# Patient Record
Sex: MALE | Race: BLACK OR AFRICAN AMERICAN | URBAN - METROPOLITAN AREA
[De-identification: names, ages, dates, MRNs, and addresses within clinical notes are randomized per-mention and may not be internally consistent; named-entity substitution may affect disease eponyms.]

---

## 2020-12-15 ENCOUNTER — APPOINTMENT (OUTPATIENT)
Dept: GENERAL RADIOLOGY | Facility: CLINIC | Age: 60
End: 2020-12-15
Attending: EMERGENCY MEDICINE
Payer: MEDICAID

## 2020-12-15 ENCOUNTER — APPOINTMENT (OUTPATIENT)
Dept: CT IMAGING | Facility: CLINIC | Age: 60
End: 2020-12-15
Attending: PHYSICIAN ASSISTANT
Payer: MEDICAID

## 2020-12-15 ENCOUNTER — ANCILLARY PROCEDURE (OUTPATIENT)
Dept: ULTRASOUND IMAGING | Facility: CLINIC | Age: 60
End: 2020-12-15
Attending: EMERGENCY MEDICINE
Payer: MEDICAID

## 2020-12-15 ENCOUNTER — HOSPITAL ENCOUNTER (INPATIENT)
Facility: CLINIC | Age: 60
LOS: 1 days | Discharge: HOME | End: 2020-12-16
Attending: EMERGENCY MEDICINE | Admitting: INTERNAL MEDICINE
Payer: MEDICAID

## 2020-12-15 ENCOUNTER — APPOINTMENT (OUTPATIENT)
Dept: CT IMAGING | Facility: CLINIC | Age: 60
End: 2020-12-15
Attending: EMERGENCY MEDICINE
Payer: MEDICAID

## 2020-12-15 DIAGNOSIS — I25.119 CORONARY ARTERY DISEASE INVOLVING NATIVE CORONARY ARTERY OF NATIVE HEART WITH ANGINA PECTORIS (H): Primary | ICD-10-CM

## 2020-12-15 DIAGNOSIS — Z20.828 EXPOSURE TO SARS-ASSOCIATED CORONAVIRUS: ICD-10-CM

## 2020-12-15 DIAGNOSIS — I24.9 ACS (ACUTE CORONARY SYNDROME) (H): ICD-10-CM

## 2020-12-15 LAB
ALBUMIN SERPL-MCNC: 3.8 G/DL (ref 3.4–5)
ALP SERPL-CCNC: 92 U/L (ref 40–150)
ALT SERPL W P-5'-P-CCNC: 23 U/L (ref 0–70)
ANION GAP SERPL CALCULATED.3IONS-SCNC: 3 MMOL/L (ref 3–14)
APTT PPP: 76 SEC (ref 22–37)
AST SERPL W P-5'-P-CCNC: 16 U/L (ref 0–45)
BASOPHILS # BLD AUTO: 0 10E9/L (ref 0–0.2)
BASOPHILS NFR BLD AUTO: 0.6 %
BILIRUB DIRECT SERPL-MCNC: 0.1 MG/DL (ref 0–0.2)
BILIRUB SERPL-MCNC: 0.4 MG/DL (ref 0.2–1.3)
BUN SERPL-MCNC: 17 MG/DL (ref 7–30)
CALCIUM SERPL-MCNC: 9.7 MG/DL (ref 8.5–10.1)
CHLORIDE SERPL-SCNC: 107 MMOL/L (ref 94–109)
CO2 SERPL-SCNC: 30 MMOL/L (ref 20–32)
CREAT SERPL-MCNC: 1.27 MG/DL (ref 0.66–1.25)
D DIMER PPP FEU-MCNC: 0.3 UG/ML FEU (ref 0–0.5)
DIFFERENTIAL METHOD BLD: NORMAL
EOSINOPHIL # BLD AUTO: 0.2 10E9/L (ref 0–0.7)
EOSINOPHIL NFR BLD AUTO: 2.1 %
ERYTHROCYTE [DISTWIDTH] IN BLOOD BY AUTOMATED COUNT: 12.4 % (ref 10–15)
GFR SERPL CREATININE-BSD FRML MDRD: 62 ML/MIN/{1.73_M2}
GLUCOSE SERPL-MCNC: 88 MG/DL (ref 70–99)
HCT VFR BLD AUTO: 44.9 % (ref 40–53)
HGB BLD-MCNC: 14.8 G/DL (ref 13.3–17.7)
IMM GRANULOCYTES # BLD: 0 10E9/L (ref 0–0.4)
IMM GRANULOCYTES NFR BLD: 0.1 %
INTERPRETATION ECG - MUSE: NORMAL
LYMPHOCYTES # BLD AUTO: 1.8 10E9/L (ref 0.8–5.3)
LYMPHOCYTES NFR BLD AUTO: 25.4 %
MCH RBC QN AUTO: 29.5 PG (ref 26.5–33)
MCHC RBC AUTO-ENTMCNC: 33 G/DL (ref 31.5–36.5)
MCV RBC AUTO: 90 FL (ref 78–100)
MONOCYTES # BLD AUTO: 0.6 10E9/L (ref 0–1.3)
MONOCYTES NFR BLD AUTO: 8.2 %
NEUTROPHILS # BLD AUTO: 4.5 10E9/L (ref 1.6–8.3)
NEUTROPHILS NFR BLD AUTO: 63.6 %
NRBC # BLD AUTO: 0 10*3/UL
NRBC BLD AUTO-RTO: 0 /100
PLATELET # BLD AUTO: 229 10E9/L (ref 150–450)
POTASSIUM SERPL-SCNC: 4.3 MMOL/L (ref 3.4–5.3)
PROT SERPL-MCNC: 7.5 G/DL (ref 6.8–8.8)
RBC # BLD AUTO: 5.01 10E12/L (ref 4.4–5.9)
SARS-COV-2 RNA SPEC QL NAA+PROBE: NORMAL
SODIUM SERPL-SCNC: 140 MMOL/L (ref 133–144)
SPECIMEN SOURCE: NORMAL
TROPONIN I BLD-MCNC: 0 UG/L (ref 0–0.08)
TROPONIN I SERPL-MCNC: <0.015 UG/L (ref 0–0.04)
UFH PPP CHRO-ACNC: 0.6 IU/ML
WBC # BLD AUTO: 7.1 10E9/L (ref 4–11)

## 2020-12-15 PROCEDURE — 99291 CRITICAL CARE FIRST HOUR: CPT | Mod: 25 | Performed by: EMERGENCY MEDICINE

## 2020-12-15 PROCEDURE — 70450 CT HEAD/BRAIN W/O DYE: CPT

## 2020-12-15 PROCEDURE — 75574 CT ANGIO HRT W/3D IMAGE: CPT | Mod: 26 | Performed by: INTERNAL MEDICINE

## 2020-12-15 PROCEDURE — 80076 HEPATIC FUNCTION PANEL: CPT | Performed by: EMERGENCY MEDICINE

## 2020-12-15 PROCEDURE — 71275 CT ANGIOGRAPHY CHEST: CPT

## 2020-12-15 PROCEDURE — 96366 THER/PROPH/DIAG IV INF ADDON: CPT | Performed by: EMERGENCY MEDICINE

## 2020-12-15 PROCEDURE — 027034Z DILATION OF CORONARY ARTERY, ONE ARTERY WITH DRUG-ELUTING INTRALUMINAL DEVICE, PERCUTANEOUS APPROACH: ICD-10-PCS | Performed by: INTERNAL MEDICINE

## 2020-12-15 PROCEDURE — 84484 ASSAY OF TROPONIN QUANT: CPT | Performed by: INTERNAL MEDICINE

## 2020-12-15 PROCEDURE — 93010 ELECTROCARDIOGRAM REPORT: CPT | Mod: 59 | Performed by: EMERGENCY MEDICINE

## 2020-12-15 PROCEDURE — 250N000011 HC RX IP 250 OP 636: Performed by: EMERGENCY MEDICINE

## 2020-12-15 PROCEDURE — 93308 TTE F-UP OR LMTD: CPT | Performed by: EMERGENCY MEDICINE

## 2020-12-15 PROCEDURE — 99285 EMERGENCY DEPT VISIT HI MDM: CPT | Mod: 25 | Performed by: EMERGENCY MEDICINE

## 2020-12-15 PROCEDURE — 999N000111 HC STATISTIC OT IP EVAL DEFER: Performed by: OCCUPATIONAL THERAPIST

## 2020-12-15 PROCEDURE — 71275 CT ANGIOGRAPHY CHEST: CPT | Mod: 26 | Performed by: INTERNAL MEDICINE

## 2020-12-15 PROCEDURE — 96376 TX/PRO/DX INJ SAME DRUG ADON: CPT | Performed by: EMERGENCY MEDICINE

## 2020-12-15 PROCEDURE — 99223 1ST HOSP IP/OBS HIGH 75: CPT | Performed by: PHYSICIAN ASSISTANT

## 2020-12-15 PROCEDURE — 84484 ASSAY OF TROPONIN QUANT: CPT

## 2020-12-15 PROCEDURE — 71045 X-RAY EXAM CHEST 1 VIEW: CPT

## 2020-12-15 PROCEDURE — 250N000013 HC RX MED GY IP 250 OP 250 PS 637: Performed by: INTERNAL MEDICINE

## 2020-12-15 PROCEDURE — 214N000001 HC R&B CCU UMMC

## 2020-12-15 PROCEDURE — 84484 ASSAY OF TROPONIN QUANT: CPT | Performed by: STUDENT IN AN ORGANIZED HEALTH CARE EDUCATION/TRAINING PROGRAM

## 2020-12-15 PROCEDURE — 93005 ELECTROCARDIOGRAM TRACING: CPT | Performed by: EMERGENCY MEDICINE

## 2020-12-15 PROCEDURE — 80048 BASIC METABOLIC PNL TOTAL CA: CPT | Performed by: EMERGENCY MEDICINE

## 2020-12-15 PROCEDURE — U0003 INFECTIOUS AGENT DETECTION BY NUCLEIC ACID (DNA OR RNA); SEVERE ACUTE RESPIRATORY SYNDROME CORONAVIRUS 2 (SARS-COV-2) (CORONAVIRUS DISEASE [COVID-19]), AMPLIFIED PROBE TECHNIQUE, MAKING USE OF HIGH THROUGHPUT TECHNOLOGIES AS DESCRIBED BY CMS-2020-01-R: HCPCS | Performed by: PHYSICIAN ASSISTANT

## 2020-12-15 PROCEDURE — 93005 ELECTROCARDIOGRAM TRACING: CPT | Mod: 76 | Performed by: EMERGENCY MEDICINE

## 2020-12-15 PROCEDURE — 4A033BC MEASUREMENT OF ARTERIAL PRESSURE, CORONARY, PERCUTANEOUS APPROACH: ICD-10-PCS | Performed by: INTERNAL MEDICINE

## 2020-12-15 PROCEDURE — 250N000013 HC RX MED GY IP 250 OP 250 PS 637: Performed by: EMERGENCY MEDICINE

## 2020-12-15 PROCEDURE — 85379 FIBRIN DEGRADATION QUANT: CPT | Performed by: EMERGENCY MEDICINE

## 2020-12-15 PROCEDURE — 96365 THER/PROPH/DIAG IV INF INIT: CPT | Mod: 59 | Performed by: EMERGENCY MEDICINE

## 2020-12-15 PROCEDURE — 93308 TTE F-UP OR LMTD: CPT | Mod: 26 | Performed by: EMERGENCY MEDICINE

## 2020-12-15 PROCEDURE — 85025 COMPLETE CBC W/AUTO DIFF WBC: CPT | Performed by: EMERGENCY MEDICINE

## 2020-12-15 PROCEDURE — 84484 ASSAY OF TROPONIN QUANT: CPT | Performed by: EMERGENCY MEDICINE

## 2020-12-15 PROCEDURE — 85520 HEPARIN ASSAY: CPT | Performed by: INTERNAL MEDICINE

## 2020-12-15 PROCEDURE — 99207 PR APP CREDIT; MD BILLING SHARED VISIT: CPT | Performed by: INTERNAL MEDICINE

## 2020-12-15 PROCEDURE — 75574 CT ANGIO HRT W/3D IMAGE: CPT

## 2020-12-15 PROCEDURE — 85027 COMPLETE CBC AUTOMATED: CPT | Performed by: EMERGENCY MEDICINE

## 2020-12-15 PROCEDURE — 70450 CT HEAD/BRAIN W/O DYE: CPT | Mod: 26 | Performed by: RADIOLOGY

## 2020-12-15 PROCEDURE — 93010 ELECTROCARDIOGRAM REPORT: CPT | Mod: 76 | Performed by: EMERGENCY MEDICINE

## 2020-12-15 PROCEDURE — B2111ZZ FLUOROSCOPY OF MULTIPLE CORONARY ARTERIES USING LOW OSMOLAR CONTRAST: ICD-10-PCS | Performed by: INTERNAL MEDICINE

## 2020-12-15 PROCEDURE — 71275 CT ANGIOGRAPHY CHEST: CPT | Mod: 26 | Performed by: RADIOLOGY

## 2020-12-15 PROCEDURE — 85730 THROMBOPLASTIN TIME PARTIAL: CPT | Performed by: INTERNAL MEDICINE

## 2020-12-15 PROCEDURE — 71045 X-RAY EXAM CHEST 1 VIEW: CPT | Mod: 26 | Performed by: RADIOLOGY

## 2020-12-15 PROCEDURE — C9803 HOPD COVID-19 SPEC COLLECT: HCPCS | Performed by: EMERGENCY MEDICINE

## 2020-12-15 PROCEDURE — 71275 CT ANGIOGRAPHY CHEST: CPT | Mod: XS

## 2020-12-15 RX ORDER — HEPARIN SODIUM 10000 [USP'U]/100ML
0-5000 INJECTION, SOLUTION INTRAVENOUS CONTINUOUS
Status: DISCONTINUED | OUTPATIENT
Start: 2020-12-15 | End: 2020-12-16

## 2020-12-15 RX ORDER — METOPROLOL TARTRATE 50 MG
50-100 TABLET ORAL
Status: CANCELLED | OUTPATIENT
Start: 2020-12-15

## 2020-12-15 RX ORDER — ONDANSETRON 2 MG/ML
4 INJECTION INTRAMUSCULAR; INTRAVENOUS
Status: DISCONTINUED | OUTPATIENT
Start: 2020-12-15 | End: 2020-12-16 | Stop reason: HOSPADM

## 2020-12-15 RX ORDER — METOPROLOL TARTRATE 50 MG
50 TABLET ORAL ONCE
Status: DISCONTINUED | OUTPATIENT
Start: 2020-12-15 | End: 2020-12-15

## 2020-12-15 RX ORDER — ACYCLOVIR 200 MG/1
0-1 CAPSULE ORAL
Status: DISCONTINUED | OUTPATIENT
Start: 2020-12-15 | End: 2020-12-16 | Stop reason: HOSPADM

## 2020-12-15 RX ORDER — ASPIRIN 81 MG/1
324 TABLET, CHEWABLE ORAL ONCE
Status: COMPLETED | OUTPATIENT
Start: 2020-12-15 | End: 2020-12-15

## 2020-12-15 RX ORDER — METHYLPREDNISOLONE SODIUM SUCCINATE 125 MG/2ML
125 INJECTION, POWDER, LYOPHILIZED, FOR SOLUTION INTRAMUSCULAR; INTRAVENOUS
Status: DISCONTINUED | OUTPATIENT
Start: 2020-12-15 | End: 2020-12-15

## 2020-12-15 RX ORDER — IVABRADINE 5 MG/1
5-15 TABLET, FILM COATED ORAL
Status: DISCONTINUED | OUTPATIENT
Start: 2020-12-15 | End: 2020-12-15

## 2020-12-15 RX ORDER — NITROGLYCERIN 0.4 MG/1
.4-.8 TABLET SUBLINGUAL
Status: DISCONTINUED | OUTPATIENT
Start: 2020-12-15 | End: 2020-12-16 | Stop reason: HOSPADM

## 2020-12-15 RX ORDER — DIPHENHYDRAMINE HYDROCHLORIDE 50 MG/ML
25-50 INJECTION INTRAMUSCULAR; INTRAVENOUS
Status: DISCONTINUED | OUTPATIENT
Start: 2020-12-15 | End: 2020-12-15

## 2020-12-15 RX ORDER — AMOXICILLIN 250 MG
1 CAPSULE ORAL
Status: DISCONTINUED | OUTPATIENT
Start: 2020-12-15 | End: 2020-12-16 | Stop reason: HOSPADM

## 2020-12-15 RX ORDER — METOPROLOL TARTRATE 25 MG/1
25-100 TABLET, FILM COATED ORAL
Status: DISCONTINUED | OUTPATIENT
Start: 2020-12-15 | End: 2020-12-15

## 2020-12-15 RX ORDER — AMOXICILLIN 250 MG
2 CAPSULE ORAL
Status: DISCONTINUED | OUTPATIENT
Start: 2020-12-15 | End: 2020-12-16 | Stop reason: HOSPADM

## 2020-12-15 RX ORDER — ACETAMINOPHEN 325 MG/1
650 TABLET ORAL EVERY 4 HOURS PRN
Status: DISCONTINUED | OUTPATIENT
Start: 2020-12-15 | End: 2020-12-16 | Stop reason: HOSPADM

## 2020-12-15 RX ORDER — ASPIRIN 81 MG/1
81 TABLET ORAL DAILY
Status: DISCONTINUED | OUTPATIENT
Start: 2020-12-16 | End: 2020-12-16

## 2020-12-15 RX ORDER — MAGNESIUM HYDROXIDE/ALUMINUM HYDROXICE/SIMETHICONE 120; 1200; 1200 MG/30ML; MG/30ML; MG/30ML
30 SUSPENSION ORAL EVERY 4 HOURS PRN
Status: DISCONTINUED | OUTPATIENT
Start: 2020-12-15 | End: 2020-12-16 | Stop reason: HOSPADM

## 2020-12-15 RX ORDER — DIPHENHYDRAMINE HCL 25 MG
25 CAPSULE ORAL
Status: DISCONTINUED | OUTPATIENT
Start: 2020-12-15 | End: 2020-12-16 | Stop reason: HOSPADM

## 2020-12-15 RX ORDER — HEPARIN SODIUM 10000 [USP'U]/100ML
0-5000 INJECTION, SOLUTION INTRAVENOUS CONTINUOUS
Status: DISCONTINUED | OUTPATIENT
Start: 2020-12-15 | End: 2020-12-15

## 2020-12-15 RX ORDER — IOPAMIDOL 755 MG/ML
120 INJECTION, SOLUTION INTRAVASCULAR ONCE
Status: COMPLETED | OUTPATIENT
Start: 2020-12-15 | End: 2020-12-15

## 2020-12-15 RX ORDER — LIDOCAINE 40 MG/G
CREAM TOPICAL
Status: DISCONTINUED | OUTPATIENT
Start: 2020-12-15 | End: 2020-12-16 | Stop reason: HOSPADM

## 2020-12-15 RX ORDER — METOPROLOL TARTRATE 1 MG/ML
5-15 INJECTION, SOLUTION INTRAVENOUS
Status: DISCONTINUED | OUTPATIENT
Start: 2020-12-15 | End: 2020-12-15

## 2020-12-15 RX ORDER — ATORVASTATIN CALCIUM 40 MG/1
40 TABLET, FILM COATED ORAL EVERY EVENING
Status: DISCONTINUED | OUTPATIENT
Start: 2020-12-15 | End: 2020-12-16 | Stop reason: HOSPADM

## 2020-12-15 RX ADMIN — ATORVASTATIN CALCIUM 40 MG: 40 TABLET, FILM COATED ORAL at 20:06

## 2020-12-15 RX ADMIN — NITROGLYCERIN 0.4 MG: 0.4 TABLET SUBLINGUAL at 15:43

## 2020-12-15 RX ADMIN — HEPARIN SODIUM 1200 UNITS/HR: 10000 INJECTION, SOLUTION INTRAVENOUS at 17:42

## 2020-12-15 RX ADMIN — ASPIRIN 81 MG CHEWABLE TABLET 324 MG: 81 TABLET CHEWABLE at 17:36

## 2020-12-15 RX ADMIN — IOPAMIDOL 120 ML: 755 INJECTION, SOLUTION INTRAVENOUS at 15:32

## 2020-12-15 ASSESSMENT — ENCOUNTER SYMPTOMS
SHORTNESS OF BREATH: 0
HEMATURIA: 0
HEADACHES: 0
SEIZURES: 0
MUSCULOSKELETAL NEGATIVE: 1
DYSURIA: 0
FREQUENCY: 0

## 2020-12-15 NOTE — ED PROVIDER NOTES
[place] EMERGENCY DEPARTMENT ([facility])  December 15, 2020  ED 11 1:39 PM   History     Chief Complaint   Patient presents with     Syncope     The history is provided by the patient, medical records and a relative (Via telephone).     Pily Gannon is a 59 year old male who presents for evaluation after a syncopal spell. Today he was at his brother's house helping with painting. He states that he he developed dizziness, chest pain, and shortness of breath. He then passed out  At approximately 1 PM.  His brothers attended to him and he regained consciousness. Patient's other brother has history of heart disease and they gave him one of his nitroglycerin after which he had a second syncopal episode.  He does endorse having chest pain prior but now denies any complaints.  He was brought here for further evaluation.  He states that he feels completely fine at this time, denies any complaints.  He denies any pain or injuries from his syncopal spell.  He denies falling from any ladder. No head injury. No leg swelling. No history of diabetes, hypertension, heart disease, DVT or PE. He is on medications including HIV medications, gabapentin three times a day. Patient has a medical alert bracelet after developing short term memory loss after he passed out; unclear history surrounding this.  No history of seizure disorder. No cough or fever. Patient flew from [place]. Patient states that in [place] he 'fell out' and his neighbors got him doesn't know how long he was unconscious. No loss of bowel or bladder control.   No headache.  Currently has no complaints and states he wants to go home.    PAST MEDICAL HISTORY: Reviewed with family, no additional pertinent information    PAST SURGICAL HISTORY: Reviewed with family no additional pertinent information    Past medical history, past surgical history, medications, and allergies were reviewed with the patient. Additional pertinent items: None    FAMILY HISTORY: No  "family history on file.    SOCIAL HISTORY:   Social History     Tobacco Use     Smoking status: Not on file   Substance Use Topics     Alcohol use: Not on file     Social history was reviewed with the patient. Additional pertinent items: None      Patient's Medications    No medications on file        No Known Allergies     Review of Systems   Respiratory: Negative for shortness of breath.    Cardiovascular: Negative for chest pain.   Genitourinary: Negative for dysuria, frequency, hematuria and urgency.   Musculoskeletal: Negative.    Neurological: Positive for syncope. Negative for seizures and headaches.     A complete review of systems was performed with pertinent positives and negatives noted in the HPI, and all other systems negative.    Physical Exam   BP: 112/74  Pulse: 65  Temp: 97.6  F (36.4  C)  Resp: 18  Weight: 112 kg (246 lb 14.6 oz)(bed scale)  SpO2: 98 %      Physical Exam   General: awake, alert, NAD  Head: normal cephalic, atraumatic  HEENT: pupils equal, conjugate gaze in tact  Neck: Supple, no midline neck tenderness  CV: regular rate and rhythm without murmur  Lungs: clear to ascultation  Abd: soft, non-tender, no guarding, no peritoneal signs  EXT: lower extremities without swelling or edema, no calf redness or tenderness.  No evidence of deformity or trauma of his upper or lower extremities  Neuro: awake, answers questions appropriately but defers to family for more complicated questions such as past medical history, medications.  He does acknowledge that he \"has dementia\" but is not sure why.  No focal deficits noted         ED Course        Procedures  Results for orders placed during the hospital encounter of 12/15/20   POC US ECHO LIMITED    Impression Limited Bedside Cardiac Ultrasound, performed and interpreted by me.   Indication: Chest Pain.  Parasternal long axis, parasternal short axis and apical 4 chamber views were acquired.   Image quality was satisfactory.    Findings:    Global " left ventricular function appears intact.  Chambers do not appear dilated.  There is no evidence of free fluid within the pericardium.    IMPRESSION: Grossly normal left ventricular function and chamber size.  No pericardial effusion.    Danny Ann MD               EKG Interpretation:      Interpreted by Danny Ann MD  Time reviewed: 1332  Symptoms at time of EKG: Previous chest pain now resolved  Rhythm: normal sinus   Rate: Normal  Axis: Normal  Ectopy: none  Conduction: normal  ST Segments/ T Waves: Concern for ST elevation in aVR, possibly II, III, aVF but this is subtle  Q Waves: none  Comparison to prior: No old EKG available    Clinical Impression: Concern for ST elevation in aVR and possible ST elevation in leads II, III and aVF           EKG Interpretation:      Interpreted by Danny Ann MD  Time reviewed:1333  Symptoms at time of EKG: Previous chest pain now resolved  Rhythm: Normal sinus   Rate: Normal  Axis: Normal  Ectopy: None  Conduction: Normal  ST Segments/ T Waves: No ST-T wave changes and No acute ischemic changes  Q Waves: None  Comparison to prior: Previous questionable ST elevation now resolved    Clinical Impression: normal EKG      Results for orders placed or performed during the hospital encounter of 12/15/20 (from the past 24 hour(s))   CBC with platelets differential   Result Value Ref Range    WBC 7.1 4.0 - 11.0 10e9/L    RBC Count 5.01 4.4 - 5.9 10e12/L    Hemoglobin 14.8 13.3 - 17.7 g/dL    Hematocrit 44.9 40.0 - 53.0 %    MCV 90 78 - 100 fl    MCH 29.5 26.5 - 33.0 pg    MCHC 33.0 31.5 - 36.5 g/dL    RDW 12.4 10.0 - 15.0 %    Platelet Count 229 150 - 450 10e9/L    Diff Method Automated Method     % Neutrophils 63.6 %    % Lymphocytes 25.4 %    % Monocytes 8.2 %    % Eosinophils 2.1 %    % Basophils 0.6 %    % Immature Granulocytes 0.1 %    Nucleated RBCs 0 0 /100    Absolute Neutrophil 4.5 1.6 - 8.3 10e9/L    Absolute Lymphocytes 1.8 0.8 - 5.3 10e9/L    Absolute Monocytes  0.6 0.0 - 1.3 10e9/L    Absolute Eosinophils 0.2 0.0 - 0.7 10e9/L    Absolute Basophils 0.0 0.0 - 0.2 10e9/L    Abs Immature Granulocytes 0.0 0 - 0.4 10e9/L    Absolute Nucleated RBC 0.0    Basic metabolic panel   Result Value Ref Range    Sodium 140 133 - 144 mmol/L    Potassium 4.3 3.4 - 5.3 mmol/L    Chloride 107 94 - 109 mmol/L    Carbon Dioxide 30 20 - 32 mmol/L    Anion Gap 3 3 - 14 mmol/L    Glucose 88 70 - 99 mg/dL    Urea Nitrogen 17 7 - 30 mg/dL    Creatinine 1.27 (H) 0.66 - 1.25 mg/dL    GFR Estimate 62 >60 mL/min/[1.73_m2]    GFR Estimate If Black 72 >60 mL/min/[1.73_m2]    Calcium 9.7 8.5 - 10.1 mg/dL   Troponin I   Result Value Ref Range    Troponin I ES <0.015 0.000 - 0.045 ug/L   D dimer quantitative   Result Value Ref Range    D Dimer 0.3 0.0 - 0.50 ug/ml FEU   EKG 12 lead   Result Value Ref Range    Interpretation ECG Click View Image link to view waveform and result    Troponin POCT   Result Value Ref Range    Troponin I 0.00 0.00 - 0.08 ug/L   POC US ECHO LIMITED    Impression    Limited Bedside Cardiac Ultrasound, performed and interpreted by me.   Indication: Chest Pain.  Parasternal long axis, parasternal short axis and apical 4 chamber views were acquired.   Image quality was satisfactory.    Findings:    Global left ventricular function appears intact.  Chambers do not appear dilated.  There is no evidence of free fluid within the pericardium.    IMPRESSION: Grossly normal left ventricular function and chamber size.  No pericardial effusion.    Danny Ann MD     Cardiology Interventional (Cath Lab) IP Consult: Patient to be seen: Routine within 24 hrs; Call back #: [id number]; sycnope, chest pain; Consultant may enter orders: Yes; Requesting provider? Attending physician    Rajni Rasmussen PA-C     12/15/2020  3:40 PM        [facility]   Cardiology Inpatient Consultation    December 15, 2020    Reason for Consult:  A cardiology consult was  "requested by Dr. Danny Rios from the   emergency department service to provide clinical guidance   regarding chest pain and syncope.    HPI:   Pily Gannon is a 59 year old male with known past medical   history of HIV and TBI who presents today due to an episode of   chest pain and syncope.  He recently moved from [place], [place] at   the behest of his family due to this history of TBI and resultant   poor cognitive function. Unfortunately, due to recently moving   (no available records on CareEverywhere at this time) and his   history of TBI, the patient can not provide any additional   details about the remainder of his past medical history.    The patient presents to [facility] ER today due to complaints of a   syncopal episode.  The patient notes that \"sometime\" this morning   he was working on a step ladder, looking up.  He is unsure how   long he was doing so, but developed acute \"dizziness\".  Describes   his dizziness as the \"room spinning.\" With onset of dizziness,   patient noted the development of left sided chest pian.  He   reports nothing made the pain better or worse.  He describes the   pain as a \"burning\" sensation. He rates the pain a  7 out of 10.   He denies associated shortness of breath, nausea, paresthesias,   and palpitations. He is unsure if he's ever had dizziness of   chest pain prior. The patient estimates that his \"dizziness\" and   \"chest pain\" lasted for a period of about 60 seconds before he   \"passed out.\"  He endorses loss of consciousness.  He states that   his brother witnessed his fall.  He is unsure if he hit his head.    He does not know how long he had lost consciousness for.      The patient then states he's \"not sure\" what happened after   awakening from his fall. He is unsure how long he was without   consciousness.   He does recall that his brother gave him 1   sublingual nitroglycerin (his bother's prescription); he is   unsure if this did \"anything.\" Per review of the ER " physician's   note (whom spoke with family), the patient was then brought to   [facility] for evaluation.      At present, the patient denies any degree of chest discomfort,   shortness of breath, dizziness, or palpitations.  He has a 40   year smoking history, sometimes smoking 1.5 PPD.  He does not   know if his family has any history of cardiac disease.  In the   past two weeks, the patient otherwise denies additional episodes   of chest pain, dyspnea at rest or with exertion, orthopnea, PND,   palpitations, lightheadedness, nausea, vomiting, fever, chills,   cough, abdominal pain, diarrhea/constipation, dysuria, new LE   pain/swelling.    Review of Systems:    Complete review of systems was performed and negative except per   HPI.    PMH:  HIV   Unable to recall additional history    Active Problems:  Chest pain  Syncope    Social History:  Social History     Tobacco Use     Smoking status: None   Substance Use Topics     Alcohol use: None     Drug use: None     Family History:  Patient is unable to recall.    Medications:    metoprolol tartrate  50 mg Oral Once       Physical Exam:  Temp:  [97.6  F (36.4  C)] 97.6  F (36.4  C)  Pulse:  [60-65] 60  Resp:  [13-19] 13  BP: (100-112)/(62-74) 101/73  SpO2:  [96 %-100 %] 100 %    GEN: Pleasant, no acute distress  HEENT: no icterus.  No evidence of cranial trauma.  CV: RRR, normal s1/s2, no murmurs/rubs/s3/s4, no heave. JVP not   appreciably elevated.   CHEST: clear to ausculation bilaterally, no rales or wheezing  ABD: soft, non-tender, normal active bowel sounds  EXTR: No clubbing, cyanosis. There is no LE edema.  NEURO: alert oriented, speech fluent/appropriate, motor grossly   non focal    Diagnostics:  EKG 12/15/2020: 0.5mm ST elevation in II, III, aVF.  No   reciprocal changes.          Assessment & Plan   Pily Gannon is a 59 year old male with known past medical   history of HIV and TBI (approximately 2 years ago) who presents   today due to an episode of chest  "pain and syncope.  He recently   moved from [place], [place] at the behest of his family due to this   history of TBI with resultant poor cognitive function.   Unfortunately, due to recently moving and his history of TBI, the   patient can not provide any additional details about the   remainder of his past medical history.    # Syncope  # Chest Pain  Poor historian.  Unclear events of the day.  Concerning story of   chest pain + dizziness and eventual syncope.  + cardiac risk   facts (tobacco use, unclear if patient also carries diagnosis of   HTN/HLD/T2DM). Would like to rule out obstructive coronary artery   disease/PE. Creatinine 1.2, GFR 72 - suitable for contrast   administration.  - CT coronary angiogram this afternoon for assessment of coronary   artery disease. Per imaging MD, will give 50mg metoprolol   tartrate x1  - CT PE given recent flight, chest pain, syncope.  Concerning for   PE  - if no coronary disease, would recommend admission for   evaluation of syncope on observation service versus Cards1   service  - if patient has obstructive coronary artery disease, will gladly   admit to [facility] for evaluation of coronary angiogram  - should patient agree to admission:   - would consider CT head given fall, unclear if patient hit his   head   - would consider formal echocardiogram for assessment of valve   function should patient agree to admission   - would trend troponin x3 if patient is amenable to admission   - would initiate telemetry for assessment of arrhythmia    # Cognitive Dysfunction in the setting of TBI  Patient not \"sure\" if he wants to pursue admission.  Unclear   cognitive function/patient decisional capacity  - recommend STAT Occupational therapy consult for MOCA evaluation  - consider social work referral         I have discussed the above with Dr. Edmund Orellana.    Thank you for consulting the cardiovascular services at the   [facility]. Please do not hesitate to "   call us with any questions.     Rajni Leigh PA-C  [facility] Cardiology Consult Team       CT Chest Pulmonary Embolism w Contrast    Narrative    Prelim:    1. No pulmonary embolism identified.  2. No other worrisome findings in the chest and upper abdomen.    In the event of a positive result for acute pulmonary embolism  resulting in right heart strain, please activate the PERT  Multidisciplinary group for consultation by paging [phone]  [phone]).     PERT -- Pulmonary Embolism Response Team (Multidisciplinary team  including cardiology, interventional radiology, critical care,  hematology)   Head CT w/o contrast    Narrative    CT HEAD W/O CONTRAST 12/15/2020 4:33 PM    History: Syncope, fall from ladder   ICD-10:    Comparison: None available.    Technique: Using multidetector thin collimation helical acquisition  technique, axial, coronal and sagittal CT images from the skull base  to the vertex were obtained without intravenous contrast.    Findings: There is no intracranial hemorrhage, mass effect, or midline  shift. Gray/white matter differentiation in both cerebral hemispheres  is preserved. Ventricles are proportionate to the cerebral sulci. The  basal cisterns are clear.    The bony calvaria and the bones of the skull base are normal. The  visualized portions of the paranasal sinuses and mastoid air cells are  clear.       Impression    Impression:  No acute intracranial pathology.     BLANE PAL MD   XR Chest Port 1 View    Narrative    Portable chest    INDICATION: Chest pain, shortness of breath    COMPARISON: None    FINDINGS: Heart size appears normal. Scattered areas of probable  atelectasis noted in the lungs. Bony structures appear grossly intact.      Impression    IMPRESSION: Scattered subsegmental atelectasis in the lungs.    MICHAELA LI MD   CT Angiogram coronary artery    Narrative    Procedure: CTA ANGIOGRAM CORONARY ARTERY, CTA CHEST WITH CONTRAST   Examination Date:  12/15/2020 4:37 PM   Indication: Chest tightness or burning, intermediate prob,  uninterpretable ECG or cannot exercise   Ordering Provider: Lu  Overall quality of the study: Good.     PROCEDURE: ECG gated multi-slice computed tomography of the heart   with and without intravenous contrast  (Isovue 370, 120 mL) was   performed on a Siemens Dual Source Flash scanner without incident.  Beta-blockers were not used to optimize heart rate. Sublingual  Nitrostat 0.4 mg was given prior to scanning. Coronary artery calcium  score was performed using the Flash scanner protocol. CTA PE was  performed in the flash mode, followed by a coronary CTA in spiral mode  at a heart rate of 57 bpm with 120 kVp. Images were reconstructed and  analyzed on a [id number] workstation. Scan protocol was optimized to  minimize radiation exposure. The total radiation exposure including  calcium score and PE study was calculated to be 387 DLP, and 5.4 mSv.         Impression    IMPRESSION:  1.  Severe mid RCA stenosis. Moderate to severe mid LAD stenosis.  Cardiology team informed of the result.  2.  Total Agatston score 625 placing the patient in the 96th  percentile when compared to age and gender matched control group.  3.  Normal caliber thoracic aorta without aneurysm or dissection.  4.  Please review Radiology report for PE study and incidental  noncardiac findings that will follow separately.    FINDINGS:    CORONARY CALCIUM SCORE    Total Agatston calcium score: 625   Left main: 0  Left anterior descendin  Left circumflex: 126  Right coronary artery: 367   This places the patient in the 96th percentile when compared to age  and gender matched control group.    CORONARY ANGIOGRAPHY    DOMINANCE: Right dominant system.   Normal coronary origins and course.    LEFT MAIN:   The left main arises normally from the left coronary cusp and is  widely patent without any detectable stenosis.     LEFT ANTERIOR DESCENDING:   Mid LAD:  Moderate-severe stenosis composed of mixed plaque.  The remainder of the left anterior descending and its major diagonal  branches are patent with minimal luminal irregularities.    LEFT CIRCUMFLEX:   Proximal LCX:Mild (25-49%) stenosis composed of calcified plaque.  The remainder of the left circumflex and its major marginal branches  are patent with minimal luminal irregularities.    RIGHT CORONARY ARTERY:   Mid RCA: Severe (>70%) stenosis composed of mixed plaque.  The remainder of the right coronary and the posterior descending  artery are patent with minimal luminal irregularities.    CTA AORTA:    1. The aortic root is normal in size. The ascending aorta, arch, and  descending aorta are normal in diameter. There is no dissection seen.  2. The aortic arch is left sided. There is normal branching of the  arch vessels. There is no coarctation seen.  3. The main and proximal branch pulmonary arteries are normal in size.    4. The systemic venous connections are normal.   5. The cardiac chambers demonstrate normal atrioventricular and  ventriculoarterial concordance.  6. Coronary arteries originate from their respective cusps.  7. The pericardium is unremarkable.  There is no pericardial effusion.      Bi-orthogonal luminal aortic dimensions are:    Aortic root measures 35 mm x 33 mm at the level of sinuses of  Valsalva.  Proximal ascending aorta measures 35 mm x 33 mm, at the level of the  main pulmonary artery.  Mid aortic arch measures 29 mm x 28 mm, proximal to the takeoff of the  left subclavian artery.  Mid descending thoracic aorta measures 23 mm x 23 mm, at the level of  the pulmonary veins.      TIARA LU MD   CTA Chest with Contrast    Narrative    Procedure: CTA ANGIOGRAM CORONARY ARTERY, CTA CHEST WITH CONTRAST   Examination Date: 12/15/2020 4:37 PM   Indication: Chest tightness or burning, intermediate prob,  uninterpretable ECG or cannot exercise   Ordering Provider: Leigh  Overall quality of  the study: Good.     PROCEDURE: ECG gated multi-slice computed tomography of the heart   with and without intravenous contrast  (Isovue 370, 120 mL) was   performed on a Siemens Dual Source Flash scanner without incident.  Beta-blockers were not used to optimize heart rate. Sublingual  Nitrostat 0.4 mg was given prior to scanning. Coronary artery calcium  score was performed using the Flash scanner protocol. CTA PE was  performed in the flash mode, followed by a coronary CTA in spiral mode  at a heart rate of 57 bpm with 120 kVp. Images were reconstructed and  analyzed on a [employer] workstation. Scan protocol was optimized to  minimize radiation exposure. The total radiation exposure including  calcium score and PE study was calculated to be 387 DLP, and 5.4 mSv.         Impression     Medications   metoprolol tartrate (LOPRESSOR) tablet 50 mg (0 mg Oral Hold 12/15/20 1513)   sodium chloride (PF) 0.9% PF flush 5-10 mL (has no administration in time range)   sodium chloride bacteriostatic 0.9 % flush 0-1 mL (has no administration in time range)   0.9% sodium chloride BOLUS (has no administration in time range)   metoprolol tartrate (LOPRESSOR) tablet  mg (has no administration in time range)   metoprolol (LOPRESSOR) injection 5-15 mg (has no administration in time range)   ivabradine (CORLANOR) tablet 5-15 mg (has no administration in time range)   lidocaine (cardiac) (XYLOCAINE) injection 100 mg (has no administration in time range)   nitroGLYcerin (NITROSTAT) sublingual tablet 0.4-0.8 mg (0.4 mg Sublingual Given 12/15/20 1543)   ondansetron (ZOFRAN) injection 4 mg (has no administration in time range)   diphenhydrAMINE (BENADRYL) capsule 25 mg (has no administration in time range)   diphenhydrAMINE (BENADRYL) injection 25-50 mg (has no administration in time range)   methylPREDNISolone sodium succinate (solu-MEDROL) injection 125 mg (has no administration in time range)   0.9% sodium chloride BOLUS (has no  administration in time range)   aspirin (ASA) chewable tablet 324 mg (has no administration in time range)   heparin ANTICOAGULANT loading dose for  LOW INTENSITY TREATMENT* Give BEFORE starting heparin infusion (has no administration in time range)   heparin 25,000 units in 0.45% NaCl 250 mL ANTICOAGULANT  infusion (has no administration in time range)   iopamidol (ISOVUE-370) solution 120 mL (120 mLs Intravenous Given 12/15/20 1532)             Assessments & Plan (with Medical Decision Making)   Patient was brought into the emergency department, placed on cardiac monitors.  Patient's initial EKG was concerning for potential STEMI though signs were subtle he had elevation in aVR with slight elevation in II, III, aVF.  Repeat EKG was normal x2.    Differential would include ACS, PE, vasovagal syncope, thoracic aortic dissection.     Patient was placed on cardiac monitors, IV access was obtained point-of-care troponin was pending.  When that the patient was on the ladder at the time of syncope will obtain head CT prior to giving aspirin unless patient's troponin comes back positive.  Stat cardiology consult, cards at bedside reviewed EKGs do not feel that patient needed immediate Cath Lab activation as patient is now chest pain-free but did not recommend serial monitoring on their service.    Point-of-care troponin was negative.    Ordered labs, head CT, chest x-ray.    Bedside ultrasound did not show any obvious focal wall motion motion abnormality, the cardiac ultrasound grossly normal.    Discussed with cardiology, they recommended a triple study to rule out dissection, angiogram, and PE.    Patient's head CT showed no sign of acute ischemia.  He was loaded with ring.  His CTA showed severe right RCA occlusion making his symptoms likely secondary to MI.  He was started on a heparin drip and admitted to the cardiology service.    He remained vitally stable while in the emergency department.    Critical care for  this patient is 60 minutes this includes bedside care, discussing the case with consultation, and documentation.    I have reviewed the nursing notes.    I have reviewed the findings, diagnosis, plan and need for follow up with the patient.    New Prescriptions    No medications on file       Final diagnoses:   ACS (acute coronary syndrome) (H)   I, Paula Rogel, am serving as a trained medical scribe to document services personally performed by Danny Ann MD based on the provider's statements to me on December 15, 2020.  This document has been checked and approved by the attending provider.    I, Danny Ann MD, was physically present and have reviewed and verified the accuracy of this note documented by Paula Rogel, medical scribe.       12/15/2020   [facility] EMERGENCY DEPARTMENT     Danny Ann MD  12/15/20 [id number]       Danny Ann MD  12/15/20 1888

## 2020-12-15 NOTE — Clinical Note
The first balloon was inserted into the right coronary artery.Max pressure = 10 delfina. Total duration = 7 seconds.

## 2020-12-15 NOTE — Clinical Note
The first balloon was inserted into the right coronary artery and proximal right coronary artery.Max pressure = 10 delfina. Total duration = 6 seconds.     Max pressure = 10 delfina. Total duration = 6 seconds.    Balloon reinflated a second time: Max pressure = 10 delfina. Total duration = 6 seconds.

## 2020-12-15 NOTE — PLAN OF CARE
OT: pt seen in ED for brief cognitive screening. Pt scoring 7 on SLUMS with score of 1-20 indicating dementia like symptoms and 21-26 indicating mild neurocognitive deficits. No formal OT/ADL evaluation complete today as pt medical evaluation pending. Will defer formal acute OT at this time, Please re-order OT as indicated.

## 2020-12-15 NOTE — H&P
"  Critical Care Cardiology   History and Physical  Pily Gannon MRN: [id number]  Age: 59 year old, : 1960  Date: 12/15/2020      History of Present Illness   Pily Gannon is a 59 year old male who is being admitted on 12/15/2020.     Patient evaluated by CSI team in the ER, H&P per consult note dictated earlier today.  The patient has a past medical history of HIV and TBI who presents today due to an episode of chest pain and syncope.  He recently moved from [place], [place] at the behest of his family due to this history of TBI and resultant poor cognitive function. Unfortunately[facility] and his history of TBI, the patient can not provide any additional details about the remainder of his past medical history.     The patient presents to [facility] ER today due to complaints of a syncopal episode.  The patient notes that \"sometime\" this morning he was working on a step ladder, looking up.  He is unsure how long he was doing so, but developed acute \"dizziness\".  Describes his dizziness as the \"room spinning.\" With onset of dizziness, patient noted the development of left sided chest pian.  He reports nothing made the pain better or worse.  He describes the pain as a \"burning\" sensation. He rates the pain a  7 out of 10. He denies associated shortness of breath, nausea, paresthesias, and palpitations. He is unsure if he's ever had dizziness of chest pain prior. The patient estimates that his \"dizziness\" and \"chest pain\" lasted for a period of about 60 seconds before he \"passed out.\"  He endorses loss of consciousness.  He states that his brother witnessed his fall.  He is unsure if he hit his head.  He does not know how long he had lost consciousness for.       The patient then states he's \"not sure\" what happened after awakening from his fall. He is unsure how long he was without consciousness.   He does recall that his brother gave him 1 " "sublingual nitroglycerin (his bother's prescription); he is unsure if this did \"anything.\" Per review of the ER physician's note (whom spoke with family), the patient was then brought to [facility] for evaluation.       At present, the patient denies any degree of chest discomfort, shortness of breath, dizziness, or palpitations.  He has a 40 year smoking history, sometimes smoking 1.5 PPD.  He does not know if his family has any history of cardiac disease.  In the past two weeks, the patient otherwise denies additional episodes of chest pain, dyspnea at rest or with exertion, orthopnea, PND, palpitations, lightheadedness, nausea, vomiting, fever, chills, cough, abdominal pain, diarrhea/constipation, dysuria, new LE pain/swelling.    Medications   I have reviewed this patient's current medications    History reviewed. No pertinent past medical history.    No family history on file.  Social History     Socioeconomic History     Marital status: Single     Spouse name: Not on file     Number of children: Not on file     Years of education: Not on file     Highest education level: Not on file   Occupational History     Not on file   Social Needs     Financial resource strain: Not on file     Food insecurity     Worry: Not on file     Inability: Not on file     Transportation needs     Medical: Not on file     Non-medical: Not on file   Tobacco Use     Smoking status: Not on file   Substance and Sexual Activity     Alcohol use: Not on file     Drug use: Not on file     Sexual activity: Not on file   Lifestyle     Physical activity     Days per week: Not on file     Minutes per session: Not on file     Stress: Not on file   Relationships     Social connections     Talks on phone: Not on file     Gets together: Not on file     Attends Rastafari service: Not on file     Active member of club or organization: Not on file     Attends meetings of clubs or organizations: Not on file     Relationship status: Not on file     Intimate " partner violence     Fear of current or ex partner: Not on file     Emotionally abused: Not on file     Physically abused: Not on file     Forced sexual activity: Not on file   Other Topics Concern     Not on file   Social History Narrative     Not on file          Physical Exam   @Vitals: /73   Pulse 62   Temp 97.6  F (36.4  C) (Oral)   Resp (!) 34   Wt 112 kg (246 lb 14.6 oz)   SpO2 100%   BMI= There is no height or weight on file to calculate BMI.   GENERAL APPEARANCE:Appears comfortable.  HEENT: No icterus, PERRL,  CARDIOVASCULAR: Regular rate and rhythm, normal S1/S2, no S3/S4 and no murmur, click or rub. Normal PMI.   RESP: Clear breath sounds bilaterally.     GASTRO: Soft, bowel sounds present.  EXTREMITIES: Warm & perfusing well.   NEURO: Strange affect, short term memory loss. No focal deficits identified.   INTEGUMENTARY: No rashes.    Vitals:    12/15/20 1320   Weight: 112 kg (246 lb 14.6 oz)   No intake/output data recorded.  Recent Labs   Lab 12/15/20  1333      POTASSIUM 4.3   CHLORIDE 107   CO2 30   ANIONGAP 3   GLC 88   BUN 17   CR 1.27*   SYDNEY 9.7     No components found for: URINE   No lab results found in last 7 days.  Temp: 97.6  F (36.4  C) Temp src: OralTemp  Min: 97.6  F (36.4  C)  Max: 97.6  F (36.4  C)   Recent Labs   Lab 12/15/20  1333   WBC 7.1   HGB 14.8   HCT 44.9   MCV 90   RDW 12.4        No lab results found in last 7 days.  Recent Labs   Lab 12/15/20  1333   GLC 88             Assessment and Plan   Pily Gannon is a 59 year old male who was admitted on 12/15/2020.  Pily Gannon is a 59 year old male with known past medical history of HIV and TBI (approximately 2 years ago) who presents today due to an episode of chest pain and syncope.  He recently moved from [place], [place] at the behest of his family due to this history of TBI with resultant poor cognitive function. Unfortunately, due to recently moving and his history of TBI, the patient can not provide any  "additional details about the remainder of his past medical history.     # ACS: Unstable Angina  # Syncope    The patient is a poor historian and reports relatively uncertain events of the day. However, he does have a concerning story of chest pain + dizziness and eventual syncope  + cardiac risk factors (tobacco use, unclear if patient also carries diagnosis of HTN/HLD/T2DM). CTA coronary done earlier today concerning for severe mid RCA stenosis and moderate to severe mid LAD stenosis.     - Recommend coronary angiogram for invasive ischemic evaluation and possible PCI to RCA +/- LAD coming AM.   - Will initiate heparin gtt, load with  mg & initiate high intensity statin.   - Transthoracic echocardiogram for assessment of LV function, RWMA & valve function.  - Check lipid panel, HbA1c. Trend troponin x 2.  - Initiate telemetry for assessment of arrhythmia.     # Cognitive Dysfunction in the setting of TBI  Patient not \"sure\" if he wants to pursue admission.  Unclear cognitive function/patient decisional capacity.  - Check syphilis titre.   - Occupational therapy consult for MOCA evaluation  - Consider social work referral       The pt was discussed and evaluated with Dr. Edmund Orellana MD, attending physician, who agrees with the assessment and plan above.   Thank you for consulting the cardiovascular services at the [facility]. Please do not hesitate to call us with any questions.          Family update by me today: Yes   Code Status: Full.  Coral Buckley MD,   Cardiovascular Disease Fellow  Pager [phone]    "

## 2020-12-15 NOTE — PROGRESS NOTES
Patient arrived to CT for CTA Coronary. Patient's HR was 60 therefore no BB was needed. Patient was given 0.4 mg of nitroglycerin on the table per MD order. Scan was completed and patient was transported back to the ED via stretcher.

## 2020-12-15 NOTE — Clinical Note
The first balloon was inserted into the right coronary artery and proximal right coronary artery.Max pressure = 8 delfina. Total duration = 10 seconds.

## 2020-12-15 NOTE — ED TRIAGE NOTES
Pt has helping brother paint and got dizzy and had reported syncopal episode. Per medics, patient's brother gave Jagdeep a Nitro due to reports of chest pain and sob. When medics got to scene and got patient up he had additional syncopal episode. Per medics, pt has h/o short term memory.

## 2020-12-15 NOTE — Clinical Note
Stent deployed in the proximal right coronary artery. Max pressure = 14 delfina. Total duration = 6 seconds.

## 2020-12-15 NOTE — Clinical Note
The first balloon was inserted into the right coronary artery and proximal right coronary artery.Max pressure = 22 delfina. Total duration = 6 seconds.     Max pressure = 14 delfina. Total duration = 6 seconds.    Balloon reinflated a second time: Max pressure = 14 delfina. Total duration = 6 seconds.

## 2020-12-15 NOTE — ED NOTES
Bed: ED11  Expected date:   Expected time:   Means of arrival:   Comments:  A623  60M  Syncope x 2   Yellow

## 2020-12-16 ENCOUNTER — APPOINTMENT (OUTPATIENT)
Dept: CARDIOLOGY | Facility: CLINIC | Age: 60
End: 2020-12-16
Payer: MEDICAID

## 2020-12-16 VITALS
OXYGEN SATURATION: 98 % | WEIGHT: 246.91 LBS | DIASTOLIC BLOOD PRESSURE: 74 MMHG | SYSTOLIC BLOOD PRESSURE: 115 MMHG | RESPIRATION RATE: 16 BRPM | HEART RATE: 65 BPM | TEMPERATURE: 97.9 F

## 2020-12-16 LAB
ANION GAP SERPL CALCULATED.3IONS-SCNC: 3 MMOL/L (ref 3–14)
BUN SERPL-MCNC: 17 MG/DL (ref 7–30)
CALCIUM SERPL-MCNC: 8.8 MG/DL (ref 8.5–10.1)
CD3 CELLS # BLD: 1898 CELLS/UL (ref 603–2990)
CD3 CELLS NFR BLD: 81 % (ref 49–84)
CD3+CD4+ CELLS # BLD: 1186 CELLS/UL (ref 441–2156)
CD3+CD4+ CELLS NFR BLD: 50 % (ref 28–63)
CD3+CD4+ CELLS/CD3+CD8+ CLL BLD: 1.72 % (ref 1.4–2.6)
CD3+CD8+ CELLS # BLD: 681 CELLS/UL (ref 125–1312)
CD3+CD8+ CELLS NFR BLD: 29 % (ref 10–40)
CHLORIDE SERPL-SCNC: 110 MMOL/L (ref 94–109)
CHOLEST SERPL-MCNC: 162 MG/DL
CO2 SERPL-SCNC: 27 MMOL/L (ref 20–32)
CREAT SERPL-MCNC: 1.26 MG/DL (ref 0.66–1.25)
ERYTHROCYTE [DISTWIDTH] IN BLOOD BY AUTOMATED COUNT: 12.4 % (ref 10–15)
GFR SERPL CREATININE-BSD FRML MDRD: 62 ML/MIN/{1.73_M2}
GLUCOSE SERPL-MCNC: 150 MG/DL (ref 70–99)
HBA1C MFR BLD: 5.8 % (ref 0–5.6)
HCT VFR BLD AUTO: 40.6 % (ref 40–53)
HDLC SERPL-MCNC: 32 MG/DL
HGB BLD-MCNC: 13.3 G/DL (ref 13.3–17.7)
IFC SPECIMEN: NORMAL
KCT BLD-ACNC: 298 SEC (ref 75–150)
LABORATORY COMMENT REPORT: NORMAL
LDLC SERPL CALC-MCNC: 77 MG/DL
MCH RBC QN AUTO: 29.2 PG (ref 26.5–33)
MCHC RBC AUTO-ENTMCNC: 32.8 G/DL (ref 31.5–36.5)
MCV RBC AUTO: 89 FL (ref 78–100)
NONHDLC SERPL-MCNC: 131 MG/DL
PLATELET # BLD AUTO: 215 10E9/L (ref 150–450)
POTASSIUM SERPL-SCNC: 4.1 MMOL/L (ref 3.4–5.3)
RBC # BLD AUTO: 4.55 10E12/L (ref 4.4–5.9)
SARS-COV-2 RNA SPEC QL NAA+PROBE: NEGATIVE
SODIUM SERPL-SCNC: 140 MMOL/L (ref 133–144)
SPECIMEN SOURCE: NORMAL
T PALLIDUM AB SER QL: NONREACTIVE
TRIGL SERPL-MCNC: 271 MG/DL
TROPONIN I SERPL-MCNC: <0.015 UG/L (ref 0–0.04)
UFH PPP CHRO-ACNC: 0.21 IU/ML
WBC # BLD AUTO: 6 10E9/L (ref 4–11)

## 2020-12-16 PROCEDURE — 93454 CORONARY ARTERY ANGIO S&I: CPT | Mod: 26 | Performed by: INTERNAL MEDICINE

## 2020-12-16 PROCEDURE — 36415 COLL VENOUS BLD VENIPUNCTURE: CPT | Performed by: INTERNAL MEDICINE

## 2020-12-16 PROCEDURE — 93454 CORONARY ARTERY ANGIO S&I: CPT | Performed by: INTERNAL MEDICINE

## 2020-12-16 PROCEDURE — 250N000013 HC RX MED GY IP 250 OP 250 PS 637: Performed by: INTERNAL MEDICINE

## 2020-12-16 PROCEDURE — 99152 MOD SED SAME PHYS/QHP 5/>YRS: CPT | Performed by: INTERNAL MEDICINE

## 2020-12-16 PROCEDURE — 93010 ELECTROCARDIOGRAM REPORT: CPT | Performed by: INTERNAL MEDICINE

## 2020-12-16 PROCEDURE — 250N000013 HC RX MED GY IP 250 OP 250 PS 637: Performed by: NURSE PRACTITIONER

## 2020-12-16 PROCEDURE — 272N000001 HC OR GENERAL SUPPLY STERILE: Performed by: INTERNAL MEDICINE

## 2020-12-16 PROCEDURE — 85347 COAGULATION TIME ACTIVATED: CPT

## 2020-12-16 PROCEDURE — 85520 HEPARIN ASSAY: CPT | Performed by: INTERNAL MEDICINE

## 2020-12-16 PROCEDURE — 93571 IV DOP VEL&/PRESS C FLO 1ST: CPT | Mod: 26 | Performed by: INTERNAL MEDICINE

## 2020-12-16 PROCEDURE — C1894 INTRO/SHEATH, NON-LASER: HCPCS | Performed by: INTERNAL MEDICINE

## 2020-12-16 PROCEDURE — 86360 T CELL ABSOLUTE COUNT/RATIO: CPT | Performed by: INTERNAL MEDICINE

## 2020-12-16 PROCEDURE — 96376 TX/PRO/DX INJ SAME DRUG ADON: CPT | Performed by: EMERGENCY MEDICINE

## 2020-12-16 PROCEDURE — C1769 GUIDE WIRE: HCPCS | Performed by: INTERNAL MEDICINE

## 2020-12-16 PROCEDURE — 93306 TTE W/DOPPLER COMPLETE: CPT | Mod: 26 | Performed by: STUDENT IN AN ORGANIZED HEALTH CARE EDUCATION/TRAINING PROGRAM

## 2020-12-16 PROCEDURE — 214N000001 HC R&B CCU UMMC

## 2020-12-16 PROCEDURE — 250N000011 HC RX IP 250 OP 636: Performed by: INTERNAL MEDICINE

## 2020-12-16 PROCEDURE — 86780 TREPONEMA PALLIDUM: CPT | Performed by: INTERNAL MEDICINE

## 2020-12-16 PROCEDURE — 99238 HOSP IP/OBS DSCHRG MGMT 30/<: CPT | Mod: 25 | Performed by: INTERNAL MEDICINE

## 2020-12-16 PROCEDURE — 93005 ELECTROCARDIOGRAM TRACING: CPT

## 2020-12-16 PROCEDURE — 255N000002 HC RX 255 OP 636: Performed by: STUDENT IN AN ORGANIZED HEALTH CARE EDUCATION/TRAINING PROGRAM

## 2020-12-16 PROCEDURE — 84484 ASSAY OF TROPONIN QUANT: CPT | Performed by: INTERNAL MEDICINE

## 2020-12-16 PROCEDURE — 92928 PRQ TCAT PLMT NTRAC ST 1 LES: CPT | Mod: RC | Performed by: INTERNAL MEDICINE

## 2020-12-16 PROCEDURE — 999N000208 ECHOCARDIOGRAM COMPLETE

## 2020-12-16 PROCEDURE — 250N000009 HC RX 250: Performed by: INTERNAL MEDICINE

## 2020-12-16 PROCEDURE — 86359 T CELLS TOTAL COUNT: CPT | Performed by: INTERNAL MEDICINE

## 2020-12-16 PROCEDURE — C9600 PERC DRUG-EL COR STENT SING: HCPCS | Mod: RC | Performed by: INTERNAL MEDICINE

## 2020-12-16 PROCEDURE — C1725 CATH, TRANSLUMIN NON-LASER: HCPCS | Performed by: INTERNAL MEDICINE

## 2020-12-16 PROCEDURE — 85027 COMPLETE CBC AUTOMATED: CPT | Performed by: INTERNAL MEDICINE

## 2020-12-16 PROCEDURE — 93571 IV DOP VEL&/PRESS C FLO 1ST: CPT | Mod: 52,LD | Performed by: INTERNAL MEDICINE

## 2020-12-16 PROCEDURE — C1874 STENT, COATED/COV W/DEL SYS: HCPCS | Performed by: INTERNAL MEDICINE

## 2020-12-16 PROCEDURE — 80061 LIPID PANEL: CPT | Performed by: INTERNAL MEDICINE

## 2020-12-16 PROCEDURE — 80048 BASIC METABOLIC PNL TOTAL CA: CPT | Performed by: INTERNAL MEDICINE

## 2020-12-16 PROCEDURE — 83036 HEMOGLOBIN GLYCOSYLATED A1C: CPT | Performed by: INTERNAL MEDICINE

## 2020-12-16 PROCEDURE — 99153 MOD SED SAME PHYS/QHP EA: CPT | Performed by: INTERNAL MEDICINE

## 2020-12-16 PROCEDURE — C1887 CATHETER, GUIDING: HCPCS | Performed by: INTERNAL MEDICINE

## 2020-12-16 DEVICE — STENT SYNERGY DRUG ELUTING 4.00X24MM  H7493926024400: Type: IMPLANTABLE DEVICE | Status: FUNCTIONAL

## 2020-12-16 RX ORDER — NITROGLYCERIN 0.4 MG/1
TABLET SUBLINGUAL
Qty: 30 TABLET | Refills: 0 | Status: SHIPPED | OUTPATIENT
Start: 2020-12-16 | End: 2021-01-15

## 2020-12-16 RX ORDER — HEPARIN SODIUM 10000 [USP'U]/100ML
100-1000 INJECTION, SOLUTION INTRAVENOUS CONTINUOUS PRN
Status: DISCONTINUED | OUTPATIENT
Start: 2020-12-16 | End: 2020-12-16 | Stop reason: HOSPADM

## 2020-12-16 RX ORDER — DIPHENHYDRAMINE HYDROCHLORIDE 50 MG/ML
INJECTION INTRAMUSCULAR; INTRAVENOUS
Status: DISCONTINUED | OUTPATIENT
Start: 2020-12-16 | End: 2020-12-16 | Stop reason: HOSPADM

## 2020-12-16 RX ORDER — FENTANYL CITRATE 50 UG/ML
25-50 INJECTION, SOLUTION INTRAMUSCULAR; INTRAVENOUS
Status: ACTIVE | OUTPATIENT
Start: 2020-12-16 | End: 2020-12-16

## 2020-12-16 RX ORDER — ASPIRIN 81 MG/1
81 TABLET, CHEWABLE ORAL ONCE
Status: DISCONTINUED | OUTPATIENT
Start: 2020-12-16 | End: 2020-12-16 | Stop reason: HOSPADM

## 2020-12-16 RX ORDER — METOPROLOL TARTRATE 25 MG/1
12.5 TABLET, FILM COATED ORAL 2 TIMES DAILY
Qty: 90 TABLET | Refills: 0 | Status: SHIPPED | OUTPATIENT
Start: 2020-12-16

## 2020-12-16 RX ORDER — NALOXONE HYDROCHLORIDE 0.4 MG/ML
0.2 INJECTION, SOLUTION INTRAMUSCULAR; INTRAVENOUS; SUBCUTANEOUS
Status: DISCONTINUED | OUTPATIENT
Start: 2020-12-16 | End: 2020-12-16 | Stop reason: HOSPADM

## 2020-12-16 RX ORDER — SODIUM CHLORIDE 9 MG/ML
INJECTION, SOLUTION INTRAVENOUS CONTINUOUS
Status: ACTIVE | OUTPATIENT
Start: 2020-12-16 | End: 2020-12-16

## 2020-12-16 RX ORDER — NITROGLYCERIN 5 MG/ML
VIAL (ML) INTRAVENOUS
Status: DISCONTINUED | OUTPATIENT
Start: 2020-12-16 | End: 2020-12-16 | Stop reason: HOSPADM

## 2020-12-16 RX ORDER — DOBUTAMINE HYDROCHLORIDE 200 MG/100ML
2-20 INJECTION INTRAVENOUS CONTINUOUS PRN
Status: DISCONTINUED | OUTPATIENT
Start: 2020-12-16 | End: 2020-12-16 | Stop reason: HOSPADM

## 2020-12-16 RX ORDER — NITROGLYCERIN 20 MG/100ML
10-200 INJECTION INTRAVENOUS CONTINUOUS PRN
Status: DISCONTINUED | OUTPATIENT
Start: 2020-12-16 | End: 2020-12-16 | Stop reason: HOSPADM

## 2020-12-16 RX ORDER — CLOPIDOGREL BISULFATE 75 MG/1
75 TABLET ORAL DAILY
Qty: 90 TABLET | Refills: 0 | Status: SHIPPED | OUTPATIENT
Start: 2020-12-17

## 2020-12-16 RX ORDER — NALOXONE HYDROCHLORIDE 0.4 MG/ML
0.4 INJECTION, SOLUTION INTRAMUSCULAR; INTRAVENOUS; SUBCUTANEOUS
Status: DISCONTINUED | OUTPATIENT
Start: 2020-12-16 | End: 2020-12-16 | Stop reason: HOSPADM

## 2020-12-16 RX ORDER — EPTIFIBATIDE 2 MG/ML
2 INJECTION, SOLUTION INTRAVENOUS CONTINUOUS PRN
Status: DISCONTINUED | OUTPATIENT
Start: 2020-12-16 | End: 2020-12-16 | Stop reason: HOSPADM

## 2020-12-16 RX ORDER — IOPAMIDOL 755 MG/ML
INJECTION, SOLUTION INTRAVASCULAR
Status: DISCONTINUED | OUTPATIENT
Start: 2020-12-16 | End: 2020-12-16 | Stop reason: HOSPADM

## 2020-12-16 RX ORDER — NITROGLYCERIN 0.4 MG/1
0.4 TABLET SUBLINGUAL EVERY 5 MIN PRN
Status: DISCONTINUED | OUTPATIENT
Start: 2020-12-16 | End: 2020-12-16 | Stop reason: HOSPADM

## 2020-12-16 RX ORDER — NITROGLYCERIN 0.4 MG/1
TABLET SUBLINGUAL
Qty: 30 TABLET | Refills: 0 | Status: CANCELLED | OUTPATIENT
Start: 2020-12-16

## 2020-12-16 RX ORDER — ATROPINE SULFATE 0.1 MG/ML
0.5 INJECTION INTRAVENOUS
Status: DISCONTINUED | OUTPATIENT
Start: 2020-12-16 | End: 2020-12-16 | Stop reason: HOSPADM

## 2020-12-16 RX ORDER — CLOPIDOGREL BISULFATE 75 MG/1
300 TABLET ORAL ONCE
Status: COMPLETED | OUTPATIENT
Start: 2020-12-16 | End: 2020-12-16

## 2020-12-16 RX ORDER — CLOPIDOGREL BISULFATE 75 MG/1
75 TABLET ORAL DAILY
Status: DISCONTINUED | OUTPATIENT
Start: 2020-12-17 | End: 2020-12-16 | Stop reason: HOSPADM

## 2020-12-16 RX ORDER — VERAPAMIL HYDROCHLORIDE 2.5 MG/ML
INJECTION, SOLUTION INTRAVENOUS
Status: DISCONTINUED | OUTPATIENT
Start: 2020-12-16 | End: 2020-12-16 | Stop reason: HOSPADM

## 2020-12-16 RX ORDER — ASPIRIN 81 MG/1
81 TABLET ORAL DAILY
Status: DISCONTINUED | OUTPATIENT
Start: 2020-12-17 | End: 2020-12-16 | Stop reason: HOSPADM

## 2020-12-16 RX ORDER — ATORVASTATIN CALCIUM 40 MG/1
40 TABLET, FILM COATED ORAL EVERY EVENING
Qty: 90 TABLET | Refills: 0 | Status: CANCELLED | OUTPATIENT
Start: 2020-12-16

## 2020-12-16 RX ORDER — ATORVASTATIN CALCIUM 40 MG/1
40 TABLET, FILM COATED ORAL EVERY EVENING
Qty: 90 TABLET | Refills: 0 | Status: SHIPPED | OUTPATIENT
Start: 2020-12-16

## 2020-12-16 RX ORDER — METOPROLOL TARTRATE 25 MG/1
12.5 TABLET, FILM COATED ORAL 2 TIMES DAILY
Qty: 90 TABLET | Refills: 0 | Status: CANCELLED | OUTPATIENT
Start: 2020-12-16

## 2020-12-16 RX ORDER — ARGATROBAN 1 MG/ML
350 INJECTION, SOLUTION INTRAVENOUS
Status: DISCONTINUED | OUTPATIENT
Start: 2020-12-16 | End: 2020-12-16 | Stop reason: HOSPADM

## 2020-12-16 RX ORDER — EPTIFIBATIDE 2 MG/ML
180 INJECTION, SOLUTION INTRAVENOUS EVERY 10 MIN PRN
Status: DISCONTINUED | OUTPATIENT
Start: 2020-12-16 | End: 2020-12-16 | Stop reason: HOSPADM

## 2020-12-16 RX ORDER — FLUMAZENIL 0.1 MG/ML
0.2 INJECTION, SOLUTION INTRAVENOUS
Status: DISCONTINUED | OUTPATIENT
Start: 2020-12-16 | End: 2020-12-16 | Stop reason: HOSPADM

## 2020-12-16 RX ORDER — HYDRALAZINE HYDROCHLORIDE 20 MG/ML
10 INJECTION INTRAMUSCULAR; INTRAVENOUS EVERY 4 HOURS PRN
Status: DISCONTINUED | OUTPATIENT
Start: 2020-12-16 | End: 2020-12-16 | Stop reason: HOSPADM

## 2020-12-16 RX ORDER — ARGATROBAN 1 MG/ML
150 INJECTION, SOLUTION INTRAVENOUS
Status: DISCONTINUED | OUTPATIENT
Start: 2020-12-16 | End: 2020-12-16 | Stop reason: HOSPADM

## 2020-12-16 RX ORDER — FENTANYL CITRATE 50 UG/ML
INJECTION, SOLUTION INTRAMUSCULAR; INTRAVENOUS
Status: DISCONTINUED | OUTPATIENT
Start: 2020-12-16 | End: 2020-12-16 | Stop reason: HOSPADM

## 2020-12-16 RX ORDER — METOPROLOL TARTRATE 1 MG/ML
5-10 INJECTION, SOLUTION INTRAVENOUS
Status: DISCONTINUED | OUTPATIENT
Start: 2020-12-16 | End: 2020-12-16 | Stop reason: HOSPADM

## 2020-12-16 RX ORDER — HEPARIN SODIUM 1000 [USP'U]/ML
INJECTION, SOLUTION INTRAVENOUS; SUBCUTANEOUS
Status: DISCONTINUED | OUTPATIENT
Start: 2020-12-16 | End: 2020-12-16 | Stop reason: HOSPADM

## 2020-12-16 RX ORDER — ASPIRIN 81 MG/1
TABLET, CHEWABLE ORAL
Status: DISCONTINUED | OUTPATIENT
Start: 2020-12-16 | End: 2020-12-16 | Stop reason: HOSPADM

## 2020-12-16 RX ORDER — DOPAMINE HYDROCHLORIDE 160 MG/100ML
2-20 INJECTION, SOLUTION INTRAVENOUS CONTINUOUS PRN
Status: DISCONTINUED | OUTPATIENT
Start: 2020-12-16 | End: 2020-12-16 | Stop reason: HOSPADM

## 2020-12-16 RX ADMIN — ATORVASTATIN CALCIUM 40 MG: 40 TABLET, FILM COATED ORAL at 19:55

## 2020-12-16 RX ADMIN — Medication 12.5 MG: at 19:56

## 2020-12-16 RX ADMIN — CLOPIDOGREL BISULFATE 300 MG: 75 TABLET ORAL at 19:55

## 2020-12-16 RX ADMIN — HUMAN ALBUMIN MICROSPHERES AND PERFLUTREN 5 ML: 10; .22 INJECTION, SOLUTION INTRAVENOUS at 10:10

## 2020-12-16 ASSESSMENT — ACTIVITIES OF DAILY LIVING (ADL): ADLS_ACUITY_SCORE: 15

## 2020-12-16 NOTE — PROGRESS NOTES
D/I/A: Pt roomed on 6c in room 411.  Arrived via bed and accompanied by RN On/Off: On monitor.  VSSA.  Rhythm upon arrival sinus bradicardia on monitor.  Denies pain or sob.  Reviewed activity restrictions and when to notify RN, ie-changes to breathing or increased chest pressure or chest pain.  CCL access:  R Radial TR band in place and inflated with 13cc air.  Bed alarm activated d/t residual forgetfulness r/t TBI.    P: Continue to monitor status.  Discharge to home once meeting criteria.

## 2020-12-16 NOTE — ED NOTES
[facility]    ED Nurse to Floor Handoff     Pily Gannon is a 59 year old male who speaks English and lives with family members,  in a home  They arrived in the ED by ambulance from home    ED Chief Complaint: Syncope    ED Dx;   Final diagnoses:   ACS (acute coronary syndrome) (H)         Needed?: No    Allergies: No Known Allergies.  Past Medical Hx: History reviewed. No pertinent past medical history.   Baseline Mental status: cognitively impaired (short term memory loss, complete history unknown)  Current Mental Status changes: at basesline    Infection present or suspected this encounter: no  Sepsis suspected: No  Isolation type: No active isolations  Patient tested for COVID 19 prior to admission: YES     Activity level - Baseline/Home:  Stand with Assist  Activity Level - Current:   Stand with Assist    Bariatric equipment needed?: No    In the ED these meds were given:   Medications   sodium chloride (PF) 0.9% PF flush 5-10 mL (has no administration in time range)   sodium chloride bacteriostatic 0.9 % flush 0-1 mL (has no administration in time range)   lidocaine (cardiac) (XYLOCAINE) injection 100 mg (has no administration in time range)   nitroGLYcerin (NITROSTAT) sublingual tablet 0.4-0.8 mg (0.4 mg Sublingual Given 12/15/20 2723)   ondansetron (ZOFRAN) injection 4 mg (has no administration in time range)   diphenhydrAMINE (BENADRYL) capsule 25 mg (has no administration in time range)   heparin 25,000 units in 0.45% NaCl 250 mL ANTICOAGULANT  infusion (1,200 Units/hr Intravenous New Bag 12/15/20 5261)   lidocaine (LMX4) cream (has no administration in time range)   alum & mag hydroxide-simethicone (MAALOX) suspension 30 mL (has no administration in time range)   senna-docusate (SENOKOT-S/PERICOLACE) 8.6-50 MG per tablet 1 tablet (has no administration in time range)     Or   senna-docusate (SENOKOT-S/PERICOLACE) 8.6-50 MG per tablet 2 tablet (has  no administration in time range)   acetaminophen (TYLENOL) tablet 650 mg (has no administration in time range)   Patient is already receiving anticoagulation with heparin, enoxaparin (LOVENOX), warfarin (COUMADIN)  or other anticoagulant medication (has no administration in time range)   aspirin EC tablet 81 mg (has no administration in time range)   atorvastatin (LIPITOR) tablet 40 mg (has no administration in time range)   iopamidol (ISOVUE-370) solution 120 mL (120 mLs Intravenous Given 12/15/20 1532)   aspirin (ASA) chewable tablet 324 mg (324 mg Oral Given 12/15/20 1736)   heparin ANTICOAGULANT loading dose for  LOW INTENSITY TREATMENT* Give BEFORE starting heparin infusion (6,000 Units Intravenous Given 12/15/20 1742)       Drips running?  Yes Heparin    Home pump  No    Current LDAs  Peripheral IV 12/15/20 Left Upper forearm (Active)   Site Assessment WDL 12/15/20 1503   Line Status Saline locked 12/15/20 1503   Number of days: 0       Labs results:   Labs Ordered and Resulted from Time of ED Arrival Up to the Time of Departure from the ED   BASIC METABOLIC PANEL - Abnormal; Notable for the following components:       Result Value    Creatinine 1.27 (*)     All other components within normal limits   CBC WITH PLATELETS DIFFERENTIAL   TROPONIN I   D DIMER QUANTITATIVE   COVID-19 VIRUS (CORONAVIRUS) BY PCR   HEPATIC PANEL   PARTIAL THROMBOPLASTIN TIME   TROPONIN I   NO VTE PROPHYLAXIS   IP ASSIGN PROVIDER TEAM TO TREATMENT TEAM   VITAL SIGNS   VITAL SIGNS   ASSESSMENT   IV ACCESS   CARDIAC CONTINUOUS MONITORING   DOCUMENT   MEASURE WEIGHT   NOTIFY PHYSICIAN   NOTIFY PHYSICIAN   NOTIFY PHYSICIAN   NOTIFY PHYSICIAN   DAILY WEIGHTS   INTAKE AND OUTPUT   DOCUMENT   PATIENT EDUCATION   TELEMETRY MONITORING CARDIOLOGY ADULT   VITAL SIGNS AND PAIN ASSESSMENT   PULSE OXIMETRY NURSING   PERIPHERAL IV CATHETER   NOTIFY PROVIDER   ACTIVITY   TROPONIN POCT       Imaging Studies:   Recent Results (from the past 24 hour(s))    POC US ECHO LIMITED    Impression    Limited Bedside Cardiac Ultrasound, performed and interpreted by me.   Indication: Chest Pain.  Parasternal long axis, parasternal short axis and apical 4 chamber views were acquired.   Image quality was satisfactory.    Findings:    Global left ventricular function appears intact.  Chambers do not appear dilated.  There is no evidence of free fluid within the pericardium.    IMPRESSION: Grossly normal left ventricular function and chamber size.  No pericardial effusion.    Danny Ann MD     CT Chest Pulmonary Embolism w Contrast    Narrative    CT CHEST PULMONARY EMBOLISM W CONTRAST, 12/15/2020 4:31 PM    History: Chest pain    Comparison: None.    Technique: Helical acquisition of CT images of the chest from the lung  apices to the kidneys were acquired after the administration of  intravenous contrast according to the CT pulmonary angiogram protocol.  Axial images were reconstructed in 1 and 3 mm slice thickness. Coronal  reconstructions performed. Three-dimensional (3D) post-processed  angiographic images were reconstructed, archived to PACS and used in  the interpretation of this study.    Contrast dose: iopamidol (ISOVUE-370) solution 120 mL    Findings:   The contrast bolus is adequate.  There is no pulmonary embolism. Main  pulmonary artery is mildly dilated, 3.1 cm    Thorax:   Support devices: None  Chest wall is unremarkable.  Calcification adjacent to the azygos vein. There are no abnormally  enlarged axillary, hilar, or mediastinal lymph nodes. Normal variant  bovine aortic arch branching pattern. Main pulmonary artery measures 3  cm in diameter. The heart is normal in size without significant  pericardial effusion. There are no significant coronary  calcifications. Coronary CTA was performed at the same time, refer to  that accompanying report.    Lungs:  The trachea and central airways are clear. The lung parenchyma is  clear without consolidation, pleural  effusion, or pneumothorax. There  are no significant pulmonary nodules. Bandlike subpleural scar in the  left lung. Focal traction bronchiectasis in the left posterior lung  base.    Upper abdomen: Limited evaluation of the upper abdomen.     Bones: No acute or aggressive osseus abnormality.      Impression    Impression:    1. No pulmonary embolism identified.  2. No other worrisome findings in the chest and upper abdomen.    In the event of a positive result for acute pulmonary embolism  resulting in right heart strain, please activate the PERT  Multidisciplinary group for consultation by paging [phone]  [phone]).     PERT -- Pulmonary Embolism Response Team (Multidisciplinary team  including cardiology, interventional radiology, critical care,  hematology)    I have personally reviewed the examination and initial interpretation  and I agree with the findings.    MALIA SANTIAGO MD   Head CT w/o contrast    Narrative    CT HEAD W/O CONTRAST 12/15/2020 4:33 PM    History: Syncope, fall from ladder   ICD-10:    Comparison: None available.    Technique: Using multidetector thin collimation helical acquisition  technique, axial, coronal and sagittal CT images from the skull base  to the vertex were obtained without intravenous contrast.    Findings: There is no intracranial hemorrhage, mass effect, or midline  shift. Gray/white matter differentiation in both cerebral hemispheres  is preserved. Ventricles are proportionate to the cerebral sulci. The  basal cisterns are clear.    The bony calvaria and the bones of the skull base are normal. The  visualized portions of the paranasal sinuses and mastoid air cells are  clear.       Impression    Impression:  No acute intracranial pathology.     BLANE PAL MD   XR Chest Port 1 View    Narrative    Portable chest    INDICATION: Chest pain, shortness of breath    COMPARISON: None    FINDINGS: Heart size appears normal. Scattered areas of probable  atelectasis noted in  the lungs. Bony structures appear grossly intact.      Impression    IMPRESSION: Scattered subsegmental atelectasis in the lungs.    MICHAELA LI MD   CT Angiogram coronary artery    Narrative    Procedure: CTA ANGIOGRAM CORONARY ARTERY, CTA CHEST WITH CONTRAST   Examination Date: 12/15/2020 4:37 PM   Indication: Chest tightness or burning, intermediate prob,  uninterpretable ECG or cannot exercise   Ordering Provider: Lu  Overall quality of the study: Good.     PROCEDURE: ECG gated multi-slice computed tomography of the heart   with and without intravenous contrast  (Isovue 370, 120 mL) was   performed on a Siemens Dual Source Flash scanner without incident.  Beta-blockers were not used to optimize heart rate. Sublingual  Nitrostat 0.4 mg was given prior to scanning. Coronary artery calcium  score was performed using the Flash scanner protocol. CTA PE was  performed in the flash mode, followed by a coronary CTA in spiral mode  at a heart rate of 57 bpm with 120 kVp. Images were reconstructed and  analyzed on a [employer] workstation. Scan protocol was optimized to  minimize radiation exposure. The total radiation exposure including  calcium score and PE study was calculated to be 387 DLP, and 5.4 mSv.         Impression    IMPRESSION:  1.  Severe mid RCA stenosis. Moderate to severe mid LAD stenosis.  Cardiology team informed of the result.  2.  Total Agatston score 625 placing the patient in the 96th  percentile when compared to age and gender matched control group.  3.  Normal caliber thoracic aorta without aneurysm or dissection.  4.  Please review Radiology report for PE study and incidental  noncardiac findings that will follow separately.    FINDINGS:    CORONARY CALCIUM SCORE    Total Agatston calcium score: 625   Left main: 0  Left anterior descendin  Left circumflex: 126  Right coronary artery: 367   This places the patient in the 96th percentile when compared to age  and gender matched  control group.    CORONARY ANGIOGRAPHY    DOMINANCE: Right dominant system.   Normal coronary origins and course.    LEFT MAIN:   The left main arises normally from the left coronary cusp and is  widely patent without any detectable stenosis.     LEFT ANTERIOR DESCENDING:   Mid LAD: Moderate-severe stenosis composed of mixed plaque.  The remainder of the left anterior descending and its major diagonal  branches are patent with minimal luminal irregularities.    LEFT CIRCUMFLEX:   Proximal LCX:Mild (25-49%) stenosis composed of calcified plaque.  The remainder of the left circumflex and its major marginal branches  are patent with minimal luminal irregularities.    RIGHT CORONARY ARTERY:   Mid RCA: Severe (>70%) stenosis composed of mixed plaque.  The remainder of the right coronary and the posterior descending  artery are patent with minimal luminal irregularities.    CTA AORTA:    1. The aortic root is normal in size. The ascending aorta, arch, and  descending aorta are normal in diameter. There is no dissection seen.  2. The aortic arch is left sided. There is normal branching of the  arch vessels. There is no coarctation seen.  3. The main and proximal branch pulmonary arteries are normal in size.    4. The systemic venous connections are normal.   5. The cardiac chambers demonstrate normal atrioventricular and  ventriculoarterial concordance.  6. Coronary arteries originate from their respective cusps.  7. The pericardium is unremarkable.  There is no pericardial effusion.      Bi-orthogonal luminal aortic dimensions are:    Aortic root measures 35 mm x 33 mm at the level of sinuses of  Valsalva.  Proximal ascending aorta measures 35 mm x 33 mm, at the level of the  main pulmonary artery.  Mid aortic arch measures 29 mm x 28 mm, proximal to the takeoff of the  left subclavian artery.  Mid descending thoracic aorta measures 23 mm x 23 mm, at the level of  the pulmonary veins.      TIARA LU MD    Radiologist Consult For Cardiology    Narrative       Limited CT of the chest without and with contrast    History:  Chest tightness or burning, intermediate prob,  uninterpretable ECG or cannot exercise      Comparison: None    TECHNIQUE: Limited CT of the chest without contrast in flash mode.  Following the uneventful administration of intravenous contrast,  limited CT of the chest was also performed in flash mode. Total DLP  1229 mGy*cm. Exam was ECG gated . The patient was scanned from the  level of the aortic arch to include the majority of the lung bases.    Findings: Mediastinum is unremarkable, and there is no pleural  effusion. No pathologically enlarged lymph nodes. Lungs are clear.  Limited abdomen is unremarkable. Soft tissues and bones are  unremarkable. Refer to the pulmonary CTA and coronary CTA performed at  the same time.      Impression    IMPRESSION:  1. No significant noncardiac findings.   2. Please see accompanying report of coronary CT from the same  acquisition for details regarding the heart.    I have personally reviewed the examination and initial interpretation  and I agree with the findings.    MALIA SANTIAGO MD   CTA Chest with Contrast    Narrative    Procedure: CTA ANGIOGRAM CORONARY ARTERY, CTA CHEST WITH CONTRAST   Examination Date: 12/15/2020 4:37 PM   Indication: Chest tightness or burning, intermediate prob,  uninterpretable ECG or cannot exercise   Ordering Provider: Lu  Overall quality of the study: Good.     PROCEDURE: ECG gated multi-slice computed tomography of the heart   with and without intravenous contrast  (Isovue 370, 120 mL) was   performed on a Siemens Dual Source Flash scanner without incident.  Beta-blockers were not used to optimize heart rate. Sublingual  Nitrostat 0.4 mg was given prior to scanning. Coronary artery calcium  score was performed using the Flash scanner protocol. CTA PE was  performed in the flash mode, followed by a coronary CTA in spiral  mode  at a heart rate of 57 bpm with 120 kVp. Images were reconstructed and  analyzed on a [facility] workstation. Scan protocol was optimized to  minimize radiation exposure. The total radiation exposure including  calcium score and PE study was calculated to be 387 DLP, and 5.4 mSv.         Impression    IMPRESSION:  1.  Severe mid RCA stenosis. Moderate to severe mid LAD stenosis.  Cardiology team informed of the result.  2.  Total Agatston score 625 placing the patient in the 96th  percentile when compared to age and gender matched control group.  3.  Normal caliber thoracic aorta without aneurysm or dissection.  4.  Please review Radiology report for PE study and incidental  noncardiac findings that will follow separately.    FINDINGS:    CORONARY CALCIUM SCORE    Total Agatston calcium score: 625   Left main: 0  Left anterior descendin  Left circumflex: 126  Right coronary artery: 367   This places the patient in the 96th percentile when compared to age  and gender matched control group.    CORONARY ANGIOGRAPHY    DOMINANCE: Right dominant system.   Normal coronary origins and course.    LEFT MAIN:   The left main arises normally from the left coronary cusp and is  widely patent without any detectable stenosis.     LEFT ANTERIOR DESCENDING:   Mid LAD: Moderate-severe stenosis composed of mixed plaque.  The remainder of the left anterior descending and its major diagonal  branches are patent with minimal luminal irregularities.    LEFT CIRCUMFLEX:   Proximal LCX:Mild (25-49%) stenosis composed of calcified plaque.  The remainder of the left circumflex and its major marginal branches  are patent with minimal luminal irregularities.    RIGHT CORONARY ARTERY:   Mid RCA: Severe (>70%) stenosis composed of mixed plaque.  The remainder of the right coronary and the posterior descending  artery are patent with minimal luminal irregularities.    CTA AORTA:    1. The aortic root is normal in size. The ascending  aorta, arch, and  descending aorta are normal in diameter. There is no dissection seen.  2. The aortic arch is left sided. There is normal branching of the  arch vessels. There is no coarctation seen.  3. The main and proximal branch pulmonary arteries are normal in size.    4. The systemic venous connections are normal.   5. The cardiac chambers demonstrate normal atrioventricular and  ventriculoarterial concordance.  6. Coronary arteries originate from their respective cusps.  7. The pericardium is unremarkable.  There is no pericardial effusion.      Bi-orthogonal luminal aortic dimensions are:    Aortic root measures 35 mm x 33 mm at the level of sinuses of  Valsalva.  Proximal ascending aorta measures 35 mm x 33 mm, at the level of the  main pulmonary artery.  Mid aortic arch measures 29 mm x 28 mm, proximal to the takeoff of the  left subclavian artery.  Mid descending thoracic aorta measures 23 mm x 23 mm, at the level of  the pulmonary veins.      TIARA LU MD       Recent vital signs:   /82   Pulse 65   Temp 97.6  F (36.4  C) (Oral)   Resp 9   Wt 112 kg (246 lb 14.6 oz)   SpO2 97%     Isha Coma Scale Score: 15 (12/15/20 1347)       Cardiac Rhythm: Normal Sinus  Pt needs tele? Yes  Skin/wound Issues: None    Code Status: Full Code    Pain control: pt had none    Nausea control: pt had none    Abnormal labs/tests/findings requiring intervention: EKG, CT imaging     Family present during ED course? No   Family Comments/Social Situation comments:     Tasks needing completion: None    Magalie Staley, RN  [phone] [facility]

## 2020-12-16 NOTE — PRE-PROCEDURE
GENERAL PRE-PROCEDURE:   Procedure:  Coronary angiogram, possible PCI  Date/Time:  12/16/2020 2:33 PM    Written consent obtained?: Yes    Risks and benefits: Risks, benefits and alternatives were discussed    Consent given by:  Patient  Patient states understanding of procedure being performed: Yes    Patient's understanding of procedure matches consent: Yes    Procedure consent matches procedure scheduled: Yes    Expected level of sedation:  Moderate  Appropriately NPO:  Yes  ASA Class:  Class 4- Severe systemic disease, acute unstable problems  Mallampati  :  Grade 3- soft palate visible, posterior pharyngeal wall not visible  Lungs:  Lungs clear with good breath sounds bilaterally  Heart:  Normal heart sounds and rate  History & Physical reviewed:  History and physical reviewed and no updates needed  Statement of review:  I have reviewed the lab findings, diagnostic data, medications, and the plan for sedation    Consent obtained and discussed with patient family as well given history.

## 2020-12-16 NOTE — DISCHARGE SUMMARY
"    [address] [address]  p: [phone]    Discharge Summary: Cardiology Service    Pily Gannon MRN# [id number]   YOB: 1960 Age: 60 year old       Admission Date: 12/15/2020  Discharge Date: 12/16/20    Discharge Diagnoses:  1. CAD  2. Syncope  3. Hypertriglyceridemia  4. Cognitive dysfunction     Brief HPI:  Pily Gannon is a 59 year old male who is being admitted on 12/15/2020.      Patient evaluated by CSI team in the ER, H&P per consult note dictated earlier today.  The patient has a past medical history of HIV and TBI who presents today due to an episode of chest pain and syncope.  He recently moved from [place], [place] at the behest of his family due to this history of TBI and resultant poor cognitive function. Unfortunately, due to recently moving (no available records on CareEverywhere at this time) and his history of TBI, the patient can not provide any additional details about the remainder of his past medical history.     The patient presents to [facility] ER today due to complaints of a syncopal episode.  The patient notes that \"sometime\" this morning he was working on a step ladder, looking up.  He is unsure how long he was doing so, but developed acute \"dizziness\".  Describes his dizziness as the \"room spinning.\" With onset of dizziness, patient noted the development of left sided chest pian.  He reports nothing made the pain better or worse.  He describes the pain as a \"burning\" sensation. He rates the pain a  7 out of 10. He denies associated shortness of breath, nausea, paresthesias, and palpitations. He is unsure if he's ever had dizziness of chest pain prior. The patient estimates that his \"dizziness\" and \"chest pain\" lasted for a period of about 60 seconds before he \"passed out.\"  He endorses loss of consciousness.  He states that his brother witnessed his fall.  He is unsure if he hit his head.  He does not know how " "long he had lost consciousness for.       The patient then states he's \"not sure\" what happened after awakening from his fall. He is unsure how long he was without consciousness.   He does recall that his brother gave him 1 sublingual nitroglycerin (his bother's prescription); he is unsure if this did \"anything.\" Per review of the ER physician's note (whom spoke with family), the patient was then brought to [facility] for evaluation.       At present, the patient denies any degree of chest discomfort, shortness of breath, dizziness, or palpitations.  He has a 40 year smoking history, sometimes smoking 1.5 PPD.  He does not know if his family has any history of cardiac disease.  In the past two weeks, the patient otherwise denies additional episodes of chest pain, dyspnea at rest or with exertion, orthopnea, PND, palpitations, lightheadedness, nausea, vomiting, fever, chills, cough, abdominal pain, diarrhea/constipation, dysuria, new LE pain/swelling.    Hospital Course by Diagnosis:  # CAD s/p PCI to RCA   # Syncope  Troponins negative x 3. EKG with no acute changes. CTA coronary done concerning for severe mid RCA stenosis and moderate to severe mid LAD stenosis. Patient underwent coronary angiogram that revealed significant focal stenosis of pRCA s/p PCI with JOSE x 1. There was also focal stenosis of the proximal LAD although 0.93 by FFR. Echo showed LVEF 60-65% and no WMAs. LDL 77. A1C 5.8. Renal function stable. Blood pressure optimized.      - DAPT: aspirin 81 mg and plavix 75 mg daily for one year  - BB: metoprolol tartrate 12.5 mg BID  - ACE: no indication (blood pressure controlled and normal LV function)  - Statin: atorvastatin 40 mg daily  - sublingual nitroglycerin as needed for chest pain   - Outpatient cardiac rehab  - Follow-up with cardiology PARKER in 1 week  - Follow- up with Dr. Blake in 6 weeks       # Hypertriglyceridemia  Triglycerides 271  - Recommend lifestyle modifications: mediterranean diet and " exercise 30 minutes x 5 days/week   - Goal BMI < 30  - Repeat FLP in 3 months  - Consider omega-3 (icosapent ethyl) 4g daily in the future if triglycerides remain elevated despite lifestyle modifications     # Cognitive Dysfunction in the setting of TBI  Reportedly a history of TBI of unknown etiology. Patient has short term memory loss. Unclear cognitive function/patient decisional capacity. Alert to self and place. Answers questions appropriately.   - Patient lives with sister, will discharge home with her, patient will be moving into group home in 2-3 days   - Recommend outpatient neurology follow-up      Pertinent Procedures:  1. CT coronary angiogram  2. Coronary angiogram     Consults:  None     Medication Changes:  See below     Discharge medications:   Current Discharge Medication List      START taking these medications    Details   aspirin (ASA) 81 MG EC tablet Take 1 tablet (81 mg) by mouth daily  Qty: 90 tablet, Refills: 0    Associated Diagnoses: Coronary artery disease involving native coronary artery of native heart with angina pectoris (H)      atorvastatin (LIPITOR) 40 MG tablet Take 1 tablet (40 mg) by mouth every evening  Qty: 90 tablet, Refills: 0    Associated Diagnoses: Coronary artery disease involving native coronary artery of native heart with angina pectoris (H)      clopidogrel (PLAVIX) 75 MG tablet Take 1 tablet (75 mg) by mouth daily  Qty: 90 tablet, Refills: 0    Associated Diagnoses: Coronary artery disease involving native coronary artery of native heart with angina pectoris (H)      metoprolol tartrate (LOPRESSOR) 25 MG tablet Take 0.5 tablets (12.5 mg) by mouth 2 times daily  Qty: 90 tablet, Refills: 0    Associated Diagnoses: Coronary artery disease involving native coronary artery of native heart with angina pectoris (H)      nitroGLYcerin (NITROSTAT) 0.4 MG sublingual tablet For chest pain place 1 tablet under the tongue every 5 minutes for 3 doses. If symptoms persist 5 minutes  after 1st dose call 911.  Qty: 30 tablet, Refills: 0    Associated Diagnoses: Coronary artery disease involving native coronary artery of native heart with angina pectoris (H)             Follow-up:  - Cardiology PARKER in 1 week   - Cardiologist in 6 weeks  - PCP in 7-10 days     Labs or imaging requiring follow-up after discharge:  CBC/BMP in 1 week   Fasting lipid panel in 3 months     Code status:  Full     Condition on discharge  Temp:  [97.9  F (36.6  C)] 97.9  F (36.6  C)  Pulse:  [58-85] 58  Resp:  [8-26] 18  BP: (100-129)/(62-88) 121/82  SpO2:  [94 %-98 %] 95 %  General: Alert, interactive, NAD  Eyes: sclera anicteric, EOMI  Neck: JVP 0, carotid 2+ bilaterally  Cardiovascular: regular rate and rhythm, normal S1 and S2, no murmurs, gallops, or rubs  Resp: clear to auscultation bilaterally, no rales, wheezes, or rhonchi  GI: Soft, nontender, nondistended. +BS.  No HSM or masses, no rebound or guarding.  Extremities: No edema, no cyanosis or clubbing, dorsalis pedis and posterior tibialis pulses 2+ bilaterally  Skin: Warm and dry, no jaundice or rash  Neuro: CN 2-12 intact, moves all extremities equally  Psych: Alert & oriented x 3    Imaging with results:  Echocardiogram 12/16/20:  Interpretation Summary  Left ventricular function, chamber size, wall motion, and wall thickness are  normal.The EF is 60-65%.  Right ventricular function, chamber size, wall motion, and thickness are  normal.  No regional wall motion abnormalities are seen.  No pericardial effusion is present.    Coronary Angiogram 12/16/20:  Conclusion         Prox RCA lesion is 60% stenosed.    Dist RCA lesion is 30% stenosed.    RPAV lesion is 25% stenosed.    RPDA lesion is 25% stenosed.    Prox LAD to Mid LAD lesion is 45% stenosed.    Ost Cx to Prox Cx lesion is 25% stenosed.    1st Mrg lesion is 30% stenosed.    Mid Cx lesion is 25% stenosed.    Dist LAD lesion is 25% stenosed.     1.  Significant focal stenosis of the proximal RCA s/p PCI  with single JOSE.  2.  Focal stenosis of the proximal LAD.  Invasive hemodynamic assessment with dPR was negative.  3.  Moderate diffuse coronary artery atherosclerosis otherwise as described above.          Plan      Follow bedrest per protocol    Continued medical management and lifestyle modifications for cardiovascular risk factor optimizations.    Continuation of dual antiplatelet therapy for 12 months   Post antiplatelet therapy of    Aspirin; give 81 mg qd .    Ticagrelor; and 90 mg BID.    Dr. Blake updated patient and sister at conclusion of the case.         Other imaging studies:  CT coronary angiogram 12/15/20   IMPRESSION:  1.  Severe mid RCA stenosis. Moderate to severe mid LAD stenosis.  Cardiology team informed of the result.  2.  Total Agatston score 625 placing the patient in the 96th  percentile when compared to age and gender matched control group.  3.  Normal caliber thoracic aorta without aneurysm or dissection.  4.  Please review Radiology report for PE study and incidental  noncardiac findings that will follow separately.    Patient Care Team:  No Ref-Primary, Physician as PCP - General    Dior Lopez DNP, APRN, CNP  [facility]  [phone]    ATTENDING NOTE:  Patient has been seen and evaluated by me on 12/16/2020. I have reviewed the documentation above.  I have reviewed today's vital signs, medications, labs, and imaging results.  I have reviewed and edited, as necessary, the history, review of systems, physical examination, and assessment and plan.  I have discussed my assessment and plan with the nurse practitioner.   I have seen and examined the patient today as part of a shared visit with Dior Lopez NP.  Pily Gannon is a 60 year old male, HIV+, TBI, with risk factor profile (-) HTN, (-) DM, (-) hypercholesterolemia, (+) active tobacco use, (?) fam Hx premature CAD, presented to [facility] ER yesterday with chest pain and syncope.   The patient is a poor historian and recently  moved to [place] for [place].  Troponin x 3 negative.  ECG normal.  Coronary CTA yesterday showed severe mid RCA stenosis and moderate/severe mid LAD stenosis.  Exam documented above.  ECHO showed LVEF 60-65%, normal wall motion, no significant valvular heart disease.  Coronary angiogram showed significant mid RCA (JOSE placed) and moderate disease in mid LAD that was physiologically insignificant.  Plan on discharge this evening.     Reji Blake MD     Cardiovascular Division

## 2020-12-16 NOTE — DISCHARGE INSTRUCTIONS
Going Home after an Angioplasty or Stent Placement (Cardiac)  ______________________________________________      After you go home:    Have an adult stay with you for 24 hours.    Drink plenty of fluids.    You may eat your normal diet, unless your doctor tells you otherwise.    For 24 hours:    Relax and take it easy.    Do NOT smoke.    Do NOT make any important or legal decisions.    Do NOT drive or operate machines at home or at work.    Do NOT drink alcohol.    Remove the Band-Aid after 24 hours. If there is minor oozing, apply another Band-aid and remove it after 12 hours.    For 2 days, do NOT have sex or do any heavy exercise.    Do NOT take a bath, or use a hot tub or pool for at least 3 days. You may shower.    Care of groin site  It is normal to have a small bruise or lump at the site.    Do not scrub the site.    For the first 2 days: Do not stoop or squat. When you cough, sneeze or move your bowels, hold your hand over the puncture site and press gently.    Do not lift more than 10 pounds for at least 3 to 5 days.    Do not use lotion or powder near the puncture site for 3 days.    If you start bleeding from the site in your groin, lie down flat and press firmly  on the site. Call your doctor as soon as you can.    Care of wrist or arm site  It is normal to have soreness at the puncture site and mild tingling in your hand for up to 3 days.    For 2 days, do not use your hand or arm to support your weight (such as rising from a chair) or bend your wrist (such as lifting a garage door).    For 2 days, do not lift more than 5 pounds or exercise your arm (tennis, golf or bowling).    If you start bleeding from the site in your arm:    Sit down and press firmly on the site with your fingers for 10 minutes. Call your doctor as soon as you can.    If the bleeding stops, sit still and keep your wrist straight for 2 hours.    Medicines    If you have started taking Plavix or Effient, do not stop taking it  until you talk to your heart doctor (cardiologist).    If you are on metformin (Glucophage), do not restart it until you have blood tests (within 2 to 3 days after discharge). When your doctor tells you it is safe, you may restart the metformin.    If you have stopped any other medicines, check with your nurse or provider about when to restart them.    Call 911 right away if you have bleeding that is heavy or does not stop.    Call your doctor if:    You have a large or growing hard lump around the site.    The site is red, swollen, hot or tender.    Blood or fluid is draining from the site.    You have chills or a fever greater than 101 F (38 C).    Your leg or arm feels numb or cool.    You have hives, a rash or unusual itching.      [facility]:  [phone] (7 days a week)            Heart-Healthy Eating Nutrition Information  (Nutrition Care Manual)  Limit saturated fats and trans-fats:  -Foods high in saturated fats include fatty meat, poultry skin, cardenas, sausage, whole milk, cream, and butter.  -Instead of butter or stick margarine, try reduced-fat, whipped, or liquid spreads. Or use healthy oils (see below).  -Artificial trans-fats (bad fats) are now limited in the [place] food supply.     Eat more omega-3 fats (heart-healthy fats):  -Good choices include salmon, tuna, mackerel, and sardines. Aim to eat fish twice a week.  -Other foods with omega-3 fats include walnuts and canola and soybean oil.  -Flaxseed is another source of omega-3 fats. Have it as flaxseed oil or ground flaxseed.    Get 20 g to 30 g dietary fiber per day:  -Fruits, vegetables, whole grains, and dried beans are good sources of fiber:  -Aim for 5 cups of fruits and vegetables per day.  -Have 3 ounces (oz) of whole grain foods every day.    Choose monounsaturated fats: Olive oil, canola oil, avocado, and unsalted nuts. Keep in mind, fats have additional calorie. Have more servings of these  good fats if your goal is for weight gain or eat these foods in moderation, but choose more often than saturated fats, if your goal is to lose weight.     Limit desserts, sweets, and added sugar.    -Plan to eat more plant-based meals, using beans and soy foods for protein.  -Talk with your dietitian or doctor about what a healthy weight is for you. Set goals to reach and maintain that weight.  -Talk with your health care team to find out what types of physical activity are best for you. Set a plan to get about 30 minutes of exercise on most days.    Tips for Reducing Sodium (Salt)  Although sodium is important for your body to function, too much sodium can be harmful for people with high blood pressure.    As sodium and fluid buildup in your tissues and bloodstream, your blood pressure increases. High blood pressure may cause damage to other organs and increase your risk for a stroke.    Even if you take a pill for blood pressure or a water pill (diuretic) to remove fluid, it is still important to have less salt in your diet. Ask your doctor what amount of sodium is right for you.    -Avoid processed foods. Eat more fresh foods.  -Fresh fruits and vegetables are naturally low in sodium, as well as frozen vegetables and fruits that have no added juices or sauces.  -Fresh meats are lower in sodium than processed meats, such as cardenas, sausage, and hotdogs. Read the nutrition label or ask your  to help you find a fresh meat that is low in sodium.    -Eat less salt at the table and when cooking.  -A single teaspoon of table salt has 2,300 mg of sodium.  -Leave the salt out of recipes for pasta, casseroles, and soups.  -Ask your RDN how to cook your favorite recipes without sodium    Be a smart .  -Look for food packages that say  salt-free  or  sodium-free.  These items contain less than 5 milligrams of sodium per serving.  - Very low-sodium  products contain less than 35 milligrams of sodium per  serving.  - Low-sodium  products contain less than 140 milligrams of sodium per serving.  -Beware of  reduced salt  or  reduced sodium  products. These items may still be high in sodium. Check the nutrition label.    Add flavors to your food without adding sodium.  -Try lemon juice, lime juice, fruit juice or vinegar.  -Dry or fresh herbs add flavor. Try basil, bay leaf, dill, rosemary, parsley, regi, dry mustard, nutmeg, thyme,and paprika.  -Pepper, red pepper flakes, and cayenne pepper can add spice to your meals without adding sodium. Hot sauce contains sodium, but if you use just a drop or two, it will not add up to much.  -Buy a sodium-free seasoning blend or make your own at home - do not buy salt substitute, as this contains significant amounts of potassium.    Food Group Foods Recommended  Grains: Whole grain breads and cereals, including oats and barley; Pasta, especially whole wheat or other whole grain types; Brown rice; Low-fat crackers and pretzels    Vegetables: Fresh, frozen, or canned vegetables without added fat or salt    Fruits: Fresh, frozen, canned, or dried fruit    Milk: Nonfat (skim), low-fat, or 1% fat milk or buttermilk; Nonfat or low-fat yogurt or cottage cheese; Fat-free and low-fat cheese    Meat and Other Protein Foods: Lean cuts of beef and pork (loin, leg, round, extra lean hamburger); Skinless poultry; Fish; Venison and other wild game; Dried beans and peas; Nuts and nut butters; Meat alternatives made with soy or textured vegetable protein; Eggs; Cold cuts made with lean meat or soy protein    Fats and Oils: Unsaturated oils (olive, peanut, soy, sunflower, canola); Soft or liquid margarines and vegetable oil spreads; Salad dressings; Seeds and nuts; Avocado    Food Group Foods Not Recommended  Grains: High-fat bakery products, such as doughnuts, biscuits, croissants, [place] pastries, pies, cookies; Snacks made with partially hydrogenated oils, including chips, cheese puffs, snack  mixes, regular crackers, butter-flavored popcorn  Vegetables: Fried vegetables; Vegetables prepared with butter, cheese, or cream sauce  Fruits: Fried fruits; Fruits served with butter or cream  Milk: Whole milk; 2% fat milk; Whole milk yogurt or ice cream; Cream; Half-&- half; Cream cheese; Sour cream; Cheese  Meat and Other Protein Food: Higher-fat cuts of meats (ribs, t-bone steak, regular hamburger); Mcknight; Sausage; Cold cuts, such as salami or bologna; Corned beef; Hot dogs; Organ meats (liver, brains, sweetbreads); Poultry with skin; Fried meat, poultry, and fish  Fats and Oils Butter: Stick margarine; Shortening; Partially hydrogenated oils; Tropical oils (coconut, palm, and palm kernel oils)    Sample One-Day Menu  Breakfast  1/2 cup apple juice  3/4 cup oatmeal  1 cup fat-free milk  1 small banana  1 cup brewed coffee  Lunch  2 slices whole-wheat bread  2 oz lean deli turkey breast  1 oz low-fat Swiss cheese  2 slices tomato  2 lettuce leaves  1 pear  1 cup nonfat milk  Afternoon Snack  1 oz trail mix (with nuts, seeds, raisins)  1 cup blueberries  1 cup nonfat milk  Evening Meal  3 oz broiled fish  1 cup brown rice  1 tsp margarine  1 medium stalk broccoli  1 medium carrot  1 cup tossed salad  1/8 cup chickpeas, for salad  1 tablespoon olive oil and vinegar dressing  1 small whole-wheat roll  1 tsp margarine  1/2 cup nonfat frozen yogurt  1/4 cup blueberries, with frozen yogurt  1 cup tea

## 2020-12-16 NOTE — ED NOTES
Initial Assessment: VSS. Communicates needs without difficulty. Pleasant and co-op. Denies SOB. Denies chest/shoulder/arm pain or discomfort. No acute distress noted. Pt has repetitive requests for the same item. Pt has short term memory challenges. Pt would like to eat and reports being hungry. Pt unable to understand the timeframe for tests and events before NPO status is lifted.

## 2020-12-17 ENCOUNTER — PATIENT OUTREACH (OUTPATIENT)
Dept: CARE COORDINATION | Facility: CLINIC | Age: 60
End: 2020-12-17

## 2020-12-17 LAB — INTERPRETATION ECG - MUSE: NORMAL

## 2020-12-17 NOTE — PROGRESS NOTES
DISCHARGE   Discharged to: Home  Via: Automobile  Accompanied by: Kev Kirkpatrick   Discharge Instructions: diet, activity, medications, follow up appointments, when to call the MD, and what to watchout for (i.e. s/s of infection, increasing SOB, palpitations, chest pain,)  Prescriptions: Medications sent with patient from [facility]; medication list reviewed & sent with pt  Follow Up Appointments: arranged; information given  Belongings: All sent with pt  IV: out  Telemetry: off  Pt exhibits understanding of above discharge instructions; all questions answered. Went over discharge instructions with Yannick.   Discharge Paperwork: faxed

## 2020-12-18 NOTE — PROGRESS NOTES
Attempted to contact the patient x3 for post-hospital call without answer. Will close encounter at this time.    Delaney Abbott CMA, [facility]  Post Hospital Discharge Team

## 2021-01-04 ENCOUNTER — TELEPHONE (OUTPATIENT)
Dept: CARDIOLOGY | Facility: CLINIC | Age: 61
End: 2021-01-04

## 2021-01-04 NOTE — TELEPHONE ENCOUNTER
S-(situation): spoke with patient and he gave me his niece Qing's number. He was discharged 12/16 after uccessful stenting or proximal RCA with Synergy 4.0x24, post dilated 4.5 mm.   Niece stated he has dementia and will need to accompany him to the visit    B-(background): 60 year old male referred for coronary angiogram after a coronary CT demonstrated significant RCA stenosis and borderline LAD stenosis.  After discussion of the risks/benefits with him and his sister, they are in agreement to proceed.    A-(assessment): coronary artery disease    R-(recommendations):   Follow bedrest per protocol    Continued medical management and lifestyle modifications for cardiovascular risk factor optimizations.    Continuation of dual antiplatelet therapy for 12 months   Post antiplatelet therapy of    Aspirin; give 81 mg qd .    Ticagrelor; and 90 mg BID.    Dr. Blake updated patient and sister at conclusion of the case.  Follow up with Yisel Pedraza Thursday January 7

## 2021-01-15 ENCOUNTER — VIRTUAL VISIT (OUTPATIENT)
Dept: CARDIOLOGY | Facility: CLINIC | Age: 61
End: 2021-01-15
Attending: NURSE PRACTITIONER
Payer: MEDICAID

## 2021-01-15 DIAGNOSIS — I25.119 CORONARY ARTERY DISEASE INVOLVING NATIVE CORONARY ARTERY OF NATIVE HEART WITH ANGINA PECTORIS (H): ICD-10-CM

## 2021-01-15 DIAGNOSIS — E78.5 HYPERLIPIDEMIA, UNSPECIFIED HYPERLIPIDEMIA TYPE: ICD-10-CM

## 2021-01-15 DIAGNOSIS — I20.89 STABLE ANGINA (H): Primary | ICD-10-CM

## 2021-01-15 DIAGNOSIS — Z98.61 CAD S/P PERCUTANEOUS CORONARY ANGIOPLASTY: ICD-10-CM

## 2021-01-15 DIAGNOSIS — I25.10 CAD S/P PERCUTANEOUS CORONARY ANGIOPLASTY: ICD-10-CM

## 2021-01-15 PROCEDURE — 99443 PR PHYSICIAN TELEPHONE EVALUATION 21-30 MIN: CPT | Performed by: NURSE PRACTITIONER

## 2021-01-15 RX ORDER — GABAPENTIN 300 MG/1
300 CAPSULE ORAL
COMMUNITY
Start: 2020-10-14

## 2021-01-15 RX ORDER — NITROGLYCERIN 0.4 MG/1
TABLET SUBLINGUAL
Qty: 25 TABLET | Refills: 1 | Status: SHIPPED | OUTPATIENT
Start: 2021-01-15

## 2021-01-15 RX ORDER — BUSPIRONE HYDROCHLORIDE 5 MG/1
5 TABLET ORAL
COMMUNITY
Start: 2020-10-14

## 2021-01-15 RX ORDER — ISOSORBIDE MONONITRATE 30 MG/1
30 TABLET, EXTENDED RELEASE ORAL DAILY
Qty: 30 TABLET | Refills: 1 | Status: SHIPPED | OUTPATIENT
Start: 2021-01-15

## 2021-01-15 NOTE — PROGRESS NOTES
"Pily is a 60 year old who is being evaluated via a billable telephone visit.      What phone number would you like to be contacted at?[phone]   How would you like to obtain your AVS? Mail a copy    Vitals - Patient Reported  Height (Patient Reported): 180.3 cm (5' 11\")  Pain Score: No Pain (0)(No SOB)    Vitals were taken and medication reconciled.    KEVIN Jacobsen  9:51 AM  "

## 2021-01-15 NOTE — PATIENT INSTRUCTIONS
Thank you for your visit with me in the Cardiovascular Clinic at the [facility]! I appreciate your time.     Cardiology provider you saw during your visit: Gabby Lin, ADALBERTO APRN CNP.    1. Start taking Imdur 30 mg daily.   2. If you have chest pain the doesn't go away after resting, then take the short-acting nitroglycerin in the glass bottle. You can take one pill (under the tongue) every 5 minutes for three doses. If the pain is still not relieved after three doses, call 911 or go to emergency room.   3. Follow up w/ Griselda Pedraza NP on 21. Call me if there are any changes between now and then.     Questions and [phone].   First press #1 for the University and then press #3 for Medical Questions to reach the Cardiology triage nurse.     On Call Cardiologist for after hours or on weekends: [phone], press option #4 and ask to speak to the on-call Cardiologist.

## 2021-01-15 NOTE — PROGRESS NOTES
"[facility]  CARDIOVASCULAR MEDICINE TELEPHONE VISIT NOTE    Referring Provider: Gabby Lin   Primary Care Provider: No Ref-Primary, Physician     Patient Name: Pily Gannon   MRN: [id number]     PERTINENT CLINICAL HISTORY:   Pily Gannon is a 60 year old male with past medical history of HIV, TBI, dementia, and recent RCA PCI who presents via telephone visit for new subtle chest pain. Patient presented to ED w/ syncopal episode and chest pain on 12/15/20. Coronary angiogram revealed significant pRCA lesion and moderate pLAD lesion (FFR 0.93). He underwent pRCA PCI w/ JOSE x 1. Post-procedure TTE showed EF 60-65%. He was started on ASA 81 mg, Plavix 75 mg, metoprolol tartrate 12.5 mg BID, and atorvastatin 40 mg.     Of note, patient has dementia and is only somewhat reliable as a historian. His sister is present on the phone to assist with the visit.   Jagdeep has been having chest pain though \"not continuously.\" He is not currently having chest pain. The pain happens when he bends over or is walking. It is a sharp, left-sided chest pain rated 5/10 on pain scale. It does not radiate. He says it feels like heartburn although does not occur after meals to his recollection. The pain always goes away when he sits down and rests. He is unsure when these episodes began (pre- or post-PCI) though he reports that the episodes seem to be happening less frequently. He also notes SOB and dizziness when bending over. He is not taking his blood pressure at home. Regarding associated symptoms, he denies SOB, PERAZA, lightheadedness, dizziness, and palpitations. Per his sister, his walking pace seems to have slowed down since he arrived in [place] two months ago though she is unsure if this is related to chest pain/SOB. He is taking his medications as prescribed with his sister's help.      PAST MEDICAL HISTORY:   No past medical history on file.     PAST SURGICAL HISTORY:     Past Surgical History:   Procedure " Laterality Date     CV CORONARY ANGIOGRAM N/A 12/16/2020    Procedure: Coronary Angiogram with possible PCI;  Surgeon: Reji Blake MD;  Location: [facility] CARDIAC CATH LAB     CV FRACTIONAL FLOW RESERVE N/A 12/16/2020    Procedure: Fractional Flow Reserve;  Surgeon: Reji Blake MD;  Location: [facility] CARDIAC CATH LAB     CV PCI STENT DRUG ELUTING N/A 12/16/2020    Procedure: Percutaneous Coronary Intervention Stent Drug Eluting;  Surgeon: Reji Blake MD;  Location: [facility] CARDIAC CATH LAB        CURRENT MEDICATIONS:     Current Outpatient Medications   Medication Sig Dispense Refill     aspirin (ASA) 81 MG EC tablet Take 1 tablet (81 mg) by mouth daily 90 tablet 0     atorvastatin (LIPITOR) 40 MG tablet Take 1 tablet (40 mg) by mouth every evening 90 tablet 0     bictegravir-emtricitabine-tenofovir (BIKTARVY) -25 MG per tablet Take 1 tablet by mouth       busPIRone (BUSPAR) 5 MG tablet Take 5 mg by mouth       clopidogrel (PLAVIX) 75 MG tablet Take 1 tablet (75 mg) by mouth daily 90 tablet 0     gabapentin (NEURONTIN) 300 MG capsule Take 300 mg by mouth       metoprolol tartrate (LOPRESSOR) 25 MG tablet Take 0.5 tablets (12.5 mg) by mouth 2 times daily 90 tablet 0     nitroGLYcerin (NITROSTAT) 0.4 MG sublingual tablet For chest pain place 1 tablet under the tongue every 5 minutes for 3 doses. If symptoms persist 5 minutes after 1st dose call 911. 30 tablet 0     sertraline (ZOLOFT) 50 MG tablet Take 50 mg by mouth          ALLERGIES:   No Known Allergies     FAMILY HISTORY:   No family history on file.     SOCIAL HISTORY:     Social History     Socioeconomic History     Marital status: Single     Spouse name: None     Number of children: None     Years of education: None     Highest education level: None   Occupational History     None   Social Needs     Financial resource strain: None     Food insecurity     Worry: None     Inability: None     Transportation needs     Medical: None      Non-medical: None   Tobacco Use     Smoking status: Current Every Day Smoker     Types: Vaping Device     Smokeless tobacco: Never Used   Substance and Sexual Activity     Alcohol use: None     Drug use: None     Sexual activity: None   Lifestyle     Physical activity     Days per week: None     Minutes per session: None     Stress: None   Relationships     Social connections     Talks on phone: None     Gets together: None     Attends Oriental orthodox service: None     Active member of club or organization: None     Attends meetings of clubs or organizations: None     Relationship status: None     Intimate partner violence     Fear of current or ex partner: None     Emotionally abused: None     Physically abused: None     Forced sexual activity: None   Other Topics Concern     None   Social History Narrative     None     Pily  has no history on file for alcohol. and  reports that he has been smoking vaping device. He has never used smokeless tobacco..     REVIEW OF SYSTEMS:   A comprehensive review of systems was performed and negative unless otherwise noted in the HPI above.      PHYSICAL EXAMINATION:   No vital signs were taken for this telephone visit.   There is no height or weight on file to calculate BMI.  Wt Readings from Last 2 Encounters:   12/15/20 112 kg (246 lb 14.6 oz)     Constitutional: no acute distress, pleasant and cooperative  Respiratory: no audible wheezes   Neurologic: Alert and oriented  Psychiatric: appropriate affect, intact thought and speech     LABORATORY DATA:     LIPID RESULTS:  Recent Labs   Lab Test 12/16/20  0623   CHOL 162   HDL 32*   LDL 77   TRIG 271*        LIVER ENZYME RESULTS:  Recent Labs   Lab Test 12/15/20  1333   AST 16   ALT 23       CBC RESULTS:  Recent Labs   Lab Test 12/16/20  0623 12/15/20  1333   WBC 6.0 7.1   HGB 13.3 14.8   HCT 40.6 44.9    229       BMP RESULTS:  Recent Labs   Lab Test 12/16/20  0623 12/15/20  1333    140   POTASSIUM 4.1 4.3   CHLORIDE  110* 107   CO2 27 30   ANIONGAP 3 3   * 88   BUN 17 17   CR 1.26* 1.27*   SYDNEY 8.8 9.7       A1C RESULTS:  Lab Results   Component Value Date    A1C 5.8 (H) 12/16/2020       INR RESULTS:  No results for input(s): INR in the last 02344 hours.       PROCEDURES & FURTHER ASSESSMENTS:     ECHO: 12/20 - Left ventricular function, chamber size, wall motion, and wall thickness are  normal.The EF is 60-65%.  Right ventricular function, chamber size, wall motion, and thickness are  normal.  No regional wall motion abnormalities are seen.  No pericardial effusion is present.    CARDIAC CATH: 12/20 - Normal LMCA. 50% proximal/mid LAD stenosis, physiologically insignificant [dPR 0.93]. 25% proximal / mid LCx, 30% OM1. 70% ulcerated appearing proximal RCA stenosis, 25% distal RCA, 25% Rt PDA, 25% Rt PAV stenosis. Successful stenting or proximal RCA with Synergy 4.0x24, post dilated 4.5 mm.      CLINICAL IMPRESSION:     Pily Gannon is a 60 year old male with past medical history of HIV, TBI, dementia, and recent RCA PCI who presents via telephone visit for new subtle chest pain.    PLAN:  Stable angina  CAD s/p RCA PCI 12/20   Moderate LAD disease (dPR 0.93)   History is challenging given patient's dementia. From what I have gathered, he is having stable angina which occurs when he is active and subsides when he is at rest. The pain does not radiate and he has no other associated symptoms. I suspect this may be due to microvascular disease and mild-moderate disease present in other vessels on the angiogram. Given that he is not having pain now and the episodes overall may be occurring less frequently, I do not think the RCA stent has thrombosed and it is too early for in-stent restenosis. We will add Imdur 30 mg daily. Will not increase beta blocker at this time given that he was bradycardic during hospital stay. We also reviewed instructions on how/when to take short-acting nitroglcyerin.   -start Imdur 30 mg daily    -follow up w/ Cardiology on 1/28/21 as scheduled  -call 911 if sudden onset chest pain which is unrelieved by nitroglycerin   -continue ASA 81 mg   -continue Plavix 75 mg   -continue metoprolol tartrate 12.5 mg BID   -continue atorvastatin 40 mg     Follow-up: w/ Griselda Pedraza NP as scheduled on 1/28 or sooner if indicated     Thank you for allowing me to take part in the care of this very pleasant patient.  Please do not hesitate to call if any further questions or concerns arise.    Gabby Lin DNP APRN CNP  Interventional and Critical Care Cardiology  [phone]    Duration of telephone visit: 22 minutes  Patient location: home  Provider location: home    CC  Patient Care Team:  No Ref-Primary, Physician as PCP - GABBY Almaraz

## 2021-01-15 NOTE — LETTER
Date:March 25, 2021      Patient was self referred, no letter generated. Do not send.        [facility] Health Information

## 2021-01-15 NOTE — LETTER
"1/15/2021      RE: Pily Gannon  [address] [id number]       Dear Colleague,    Thank you for the opportunity to participate in the care of your patient, Pily Gannon, at the [facility] [place] at [facility]. Please see a copy of my visit note below.    Pily is a 60 year old who is being evaluated via a billable telephone visit.      What phone number would you like to be contacted at?[phone]   How would you like to obtain your AVS? Mail a copy    Vitals - Patient Reported  Height (Patient Reported): 180.3 cm (5' 11\")  Pain Score: No Pain (0)(No SOB)    Vitals were taken and medication reconciled.    KEVIN Jacobsen  9:51 AM    [facility]  CARDIOVASCULAR MEDICINE TELEPHONE VISIT NOTE    Referring Provider: Gabby Lin   Primary Care Provider: Jennifer Ref-Primary, Physician     Patient Name: Pily Gannon   MRN: [id number]     PERTINENT CLINICAL HISTORY:   Pily Gannon is a 60 year old male with past medical history of HIV, TBI, dementia, and recent RCA PCI who presents via telephone visit for new subtle chest pain. Patient presented to ED w/ syncopal episode and chest pain on 12/15/20. Coronary angiogram revealed significant pRCA lesion and moderate pLAD lesion (FFR 0.93). He underwent pRCA PCI w/ JOSE x 1. Post-procedure TTE showed EF 60-65%. He was started on ASA 81 mg, Plavix 75 mg, metoprolol tartrate 12.5 mg BID, and atorvastatin 40 mg.     Of note, patient has dementia and is only somewhat reliable as a historian. His sister is present on the phone to assist with the visit.   Jagdeep has been having chest pain though \"not continuously.\" He is not currently having chest pain. The pain happens when he bends over or is walking. It is a sharp, left-sided chest pain rated 5/10 on pain scale. It does not radiate. He says it feels like heartburn although does not occur after meals to his recollection. The pain always goes away " when he sits down and rests. He is unsure when these episodes began (pre- or post-PCI) though he reports that the episodes seem to be happening less frequently. He also notes SOB and dizziness when bending over. He is not taking his blood pressure at home. Regarding associated symptoms, he denies SOB, PERAZA, lightheadedness, dizziness, and palpitations. Per his sister, his walking pace seems to have slowed down since he arrived in [place] two months ago though she is unsure if this is related to chest pain/SOB. He is taking his medications as prescribed with his sister's help.      PAST MEDICAL HISTORY:   No past medical history on file.     PAST SURGICAL HISTORY:     Past Surgical History:   Procedure Laterality Date     CV CORONARY ANGIOGRAM N/A 12/16/2020    Procedure: Coronary Angiogram with possible PCI;  Surgeon: Reji Blake MD;  Location: [facility] CARDIAC CATH LAB     CV FRACTIONAL FLOW RESERVE N/A 12/16/2020    Procedure: Fractional Flow Reserve;  Surgeon: Reji Blake MD;  Location: [facility] CARDIAC CATH LAB     CV PCI STENT DRUG ELUTING N/A 12/16/2020    Procedure: Percutaneous Coronary Intervention Stent Drug Eluting;  Surgeon: Reji Blake MD;  Location: [facility] CARDIAC CATH LAB        CURRENT MEDICATIONS:     Current Outpatient Medications   Medication Sig Dispense Refill     aspirin (ASA) 81 MG EC tablet Take 1 tablet (81 mg) by mouth daily 90 tablet 0     atorvastatin (LIPITOR) 40 MG tablet Take 1 tablet (40 mg) by mouth every evening 90 tablet 0     bictegravir-emtricitabine-tenofovir (BIKTARVY) -25 MG per tablet Take 1 tablet by mouth       busPIRone (BUSPAR) 5 MG tablet Take 5 mg by mouth       clopidogrel (PLAVIX) 75 MG tablet Take 1 tablet (75 mg) by mouth daily 90 tablet 0     gabapentin (NEURONTIN) 300 MG capsule Take 300 mg by mouth       metoprolol tartrate (LOPRESSOR) 25 MG tablet Take 0.5 tablets (12.5 mg) by mouth 2 times daily 90 tablet 0     nitroGLYcerin  (NITROSTAT) 0.4 MG sublingual tablet For chest pain place 1 tablet under the tongue every 5 minutes for 3 doses. If symptoms persist 5 minutes after 1st dose call 911. 30 tablet 0     sertraline (ZOLOFT) 50 MG tablet Take 50 mg by mouth          ALLERGIES:   No Known Allergies     FAMILY HISTORY:   No family history on file.     SOCIAL HISTORY:     Social History     Socioeconomic History     Marital status: Single     Spouse name: None     Number of children: None     Years of education: None     Highest education level: None   Occupational History     None   Social Needs     Financial resource strain: None     Food insecurity     Worry: None     Inability: None     Transportation needs     Medical: None     Non-medical: None   Tobacco Use     Smoking status: Current Every Day Smoker     Types: Vaping Device     Smokeless tobacco: Never Used   Substance and Sexual Activity     Alcohol use: None     Drug use: None     Sexual activity: None   Lifestyle     Physical activity     Days per week: None     Minutes per session: None     Stress: None   Relationships     Social connections     Talks on phone: None     Gets together: None     Attends Muslim service: None     Active member of club or organization: None     Attends meetings of clubs or organizations: None     Relationship status: None     Intimate partner violence     Fear of current or ex partner: None     Emotionally abused: None     Physically abused: None     Forced sexual activity: None   Other Topics Concern     None   Social History Narrative     None     Pily  has no history on file for alcohol. and  reports that he has been smoking vaping device. He has never used smokeless tobacco..     REVIEW OF SYSTEMS:   A comprehensive review of systems was performed and negative unless otherwise noted in the HPI above.      PHYSICAL EXAMINATION:   No vital signs were taken for this telephone visit.   There is no height or weight on file to calculate BMI.  Wt  Readings from Last 2 Encounters:   12/15/20 112 kg (246 lb 14.6 oz)     Constitutional: no acute distress, pleasant and cooperative  Respiratory: no audible wheezes   Neurologic: Alert and oriented  Psychiatric: appropriate affect, intact thought and speech     LABORATORY DATA:     LIPID RESULTS:  Recent Labs   Lab Test 12/16/20  0623   CHOL 162   HDL 32*   LDL 77   TRIG 271*        LIVER ENZYME RESULTS:  Recent Labs   Lab Test 12/15/20  1333   AST 16   ALT 23       CBC RESULTS:  Recent Labs   Lab Test 12/16/20  0623 12/15/20  1333   WBC 6.0 7.1   HGB 13.3 14.8   HCT 40.6 44.9    229       BMP RESULTS:  Recent Labs   Lab Test 12/16/20  0623 12/15/20  1333    140   POTASSIUM 4.1 4.3   CHLORIDE 110* 107   CO2 27 30   ANIONGAP 3 3   * 88   BUN 17 17   CR 1.26* 1.27*   SYDNEY 8.8 9.7       A1C RESULTS:  Lab Results   Component Value Date    A1C 5.8 (H) 12/16/2020       INR RESULTS:  No results for input(s): INR in the last 70796 hours.       PROCEDURES & FURTHER ASSESSMENTS:     ECHO: 12/20 - Left ventricular function, chamber size, wall motion, and wall thickness are  normal.The EF is 60-65%.  Right ventricular function, chamber size, wall motion, and thickness are  normal.  No regional wall motion abnormalities are seen.  No pericardial effusion is present.    CARDIAC CATH: 12/20 - Normal LMCA. 50% proximal/mid LAD stenosis, physiologically insignificant [dPR 0.93]. 25% proximal / mid LCx, 30% OM1. 70% ulcerated appearing proximal RCA stenosis, 25% distal RCA, 25% Rt PDA, 25% Rt PAV stenosis. Successful stenting or proximal RCA with Synergy 4.0x24, post dilated 4.5 mm.      CLINICAL IMPRESSION:     Pily Gannon is a 60 year old male with past medical history of HIV, TBI, dementia, and recent RCA PCI who presents via telephone visit for new subtle chest pain.    PLAN:  Stable angina  CAD s/p RCA PCI 12/20   Moderate LAD disease (dPR 0.93)   History is challenging given patient's dementia. From what I  have gathered, he is having stable angina which occurs when he is active and subsides when he is at rest. The pain does not radiate and he has no other associated symptoms. I suspect this may be due to microvascular disease and mild-moderate disease present in other vessels on the angiogram. Given that he is not having pain now and the episodes overall may be occurring less frequently, I do not think the RCA stent has thrombosed and it is too early for in-stent restenosis. We will add Imdur 30 mg daily. Will not increase beta blocker at this time given that he was bradycardic during hospital stay. We also reviewed instructions on how/when to take short-acting nitroglcyerin.   -start Imdur 30 mg daily   -follow up w/ Cardiology on 1/28/21 as scheduled  -call 911 if sudden onset chest pain which is unrelieved by nitroglycerin   -continue ASA 81 mg   -continue Plavix 75 mg   -continue metoprolol tartrate 12.5 mg BID   -continue atorvastatin 40 mg     Follow-up: w/ Griselda Pedraza NP as scheduled on 1/28 or sooner if indicated     Thank you for allowing me to take part in the care of this very pleasant patient.  Please do not hesitate to call if any further questions or concerns arise.    ADALBERTO Rocha CNP  Interventional and Critical Care Cardiology  [phone]    Duration of telephone visit: 22 minutes  Patient location: home  Provider location: home    CC  Patient Care Team:  No Ref-Primary, Physician as PCP - RACHAEL Almaraz            Please do not hesitate to contact me if you have any questions/concerns.     Sincerely,     SATHISH Sanchez CNP

## 2021-01-28 ENCOUNTER — OFFICE VISIT (OUTPATIENT)
Dept: CARDIOLOGY | Facility: CLINIC | Age: 61
End: 2021-01-28
Attending: NURSE PRACTITIONER
Payer: MEDICAID

## 2021-01-28 VITALS
BODY MASS INDEX: 34.86 KG/M2 | HEART RATE: 67 BPM | WEIGHT: 249 LBS | DIASTOLIC BLOOD PRESSURE: 78 MMHG | SYSTOLIC BLOOD PRESSURE: 121 MMHG | HEIGHT: 71 IN | OXYGEN SATURATION: 95 %

## 2021-01-28 DIAGNOSIS — I10 ESSENTIAL HYPERTENSION: ICD-10-CM

## 2021-01-28 DIAGNOSIS — R07.89 ATYPICAL CHEST PAIN: ICD-10-CM

## 2021-01-28 DIAGNOSIS — I25.10 CORONARY ARTERY DISEASE INVOLVING NATIVE CORONARY ARTERY OF NATIVE HEART WITHOUT ANGINA PECTORIS: Primary | ICD-10-CM

## 2021-01-28 DIAGNOSIS — E78.00 PURE HYPERCHOLESTEROLEMIA: ICD-10-CM

## 2021-01-28 PROCEDURE — G0463 HOSPITAL OUTPT CLINIC VISIT: HCPCS

## 2021-01-28 PROCEDURE — 99213 OFFICE O/P EST LOW 20 MIN: CPT | Performed by: NURSE PRACTITIONER

## 2021-01-28 RX ORDER — ISOSORBIDE MONONITRATE 30 MG/1
30 TABLET, EXTENDED RELEASE ORAL DAILY
Qty: 30 TABLET | Refills: 9 | Status: SHIPPED | OUTPATIENT
Start: 2021-01-28 | End: 2021-03-16

## 2021-01-28 ASSESSMENT — PAIN SCALES - GENERAL: PAINLEVEL: NO PAIN (0)

## 2021-01-28 ASSESSMENT — MIFFLIN-ST. JEOR: SCORE: 1971.59

## 2021-01-28 NOTE — NURSING NOTE
Chief Complaint   Patient presents with     Follow Up     CORS and S/P Stent Placement on 12/16/20.      Vitals were taken and medications were reconciled.     Earlene De Oliveira RMA  3:16 PM

## 2021-01-28 NOTE — LETTER
1/28/2021      RE: Pily Gannon  [address] [facility] [place] [address]       Dear Colleague,    Thank you for the opportunity to participate in the care of your patient, Pily Gannon, at the [facility] [place] at [facility]. Please see a copy of my visit note below.          Cardiology Clinic Note    HPI: Pily Gannon is a 60 year old male with past medical history of HIV, TBI, dementia, and recent RCA PCI who presents via telephone visit for new subtle chest pain. Patient presented to ED w/ syncopal episode and chest pain on 12/15/20. Coronary angiogram revealed significant pRCA lesion and moderate pLAD lesion (FFR 0.93). He underwent pRCA PCI w/ JOSE x 1. Post-procedure TTE showed EF 60-65%. He was started on ASA 81 mg, Plavix 75 mg, metoprolol tartrate 12.5 mg BID, and atorvastatin 40 mg. Patient was seen a week ago by Gabby Lin NP for possible chest pain and Imdur was started.     Of note, patient has dementia and though  Family member presents with him to his visit today, history taking is somewhat difficult. Further, patient presents 25 minutes late to this appointment.    Since last visit, Jagdeep tells me nothing has changed. He believes the symptoms he was having before are due to his eating habits. He is not sure when he gets the symptoms, nor what relieves or exacerbates them. Symptoms are feeling light headed when standing and heartburn feeling after eating. He tells me this is not new, nor has anything changed since his angiogram. This was present both before and after. He does not exercise and has not started cardiac rehab.  No breathing concerns, no palpitations, no syncope. He was prescribed Imdur at his last visit, though never started taking it. He does not check his BP at home, though it Is well controlled in clinic. No bleeding, believes he takes his meds as prescribed with the assistance of his family who sets it up. He has never  taken a SL Nitroglycerine. He does not eat healthy. He has gained weight, sister believes due to resuming selective serotonin reuptake inhibitor in the last 2 months. No LE swelling, orthopnea. No other concerns voiced.     Current Outpatient Medications   Medication Sig Dispense Refill     aspirin (ASA) 81 MG EC tablet Take 1 tablet (81 mg) by mouth daily 90 tablet 0     atorvastatin (LIPITOR) 40 MG tablet Take 1 tablet (40 mg) by mouth every evening 90 tablet 0     bictegravir-emtricitabine-tenofovir (BIKTARVY) -25 MG per tablet Take 1 tablet by mouth       busPIRone (BUSPAR) 5 MG tablet Take 5 mg by mouth       clopidogrel (PLAVIX) 75 MG tablet Take 1 tablet (75 mg) by mouth daily 90 tablet 0     gabapentin (NEURONTIN) 300 MG capsule Take 300 mg by mouth       isosorbide mononitrate (IMDUR) 30 MG 24 hr tablet Take 1 tablet (30 mg) by mouth daily 30 tablet 1     metoprolol tartrate (LOPRESSOR) 25 MG tablet Take 0.5 tablets (12.5 mg) by mouth 2 times daily 90 tablet 0     nitroGLYcerin (NITROSTAT) 0.4 MG sublingual tablet For chest pain place 1 tablet under the tongue every 5 minutes for 3 doses. If symptoms persist 5 minutes after 1st dose call 911. 25 tablet 1     sertraline (ZOLOFT) 50 MG tablet Take 50 mg by mouth         No past medical history on file.    Past Surgical History:   Procedure Laterality Date     CV CORONARY ANGIOGRAM N/A 12/16/2020    Procedure: Coronary Angiogram with possible PCI;  Surgeon: Reji Blake MD;  Location: [facility] CARDIAC CATH LAB     CV FRACTIONAL FLOW RESERVE N/A 12/16/2020    Procedure: Fractional Flow Reserve;  Surgeon: Reji Blake MD;  Location: [facility] CARDIAC CATH LAB     CV PCI STENT DRUG ELUTING N/A 12/16/2020    Procedure: Percutaneous Coronary Intervention Stent Drug Eluting;  Surgeon: Reji Blake MD;  Location: [facility] CARDIAC CATH LAB       No family history on file.    Social History     Tobacco Use     Smoking status: Current Every Day  "Smoker     Types: Vaping Device     Smokeless tobacco: Never Used   Substance Use Topics     Alcohol use: Not on file       No Known Allergies      ROS:   Negative besides that noted in HPI      Physical Examination:  Vitals: Ht 1.803 m (5' 11\")   Wt 112.9 kg (249 lb)   BMI 34.73 kg/m    BMI= Body mass index is 34.73 kg/m .    GENERAL APPEARANCE: healthy, alert and no distress  HEENT: no icterus  NECK: JVP is not visible  CHEST: lungs clear to auscultation - no rales, rhonchi or wheezes  CARDIOVASCULAR: regular rhythm, normal S1, S2,   ABDOMEN: soft, non tender  EXTREMITIES: no clubbing, cyanosis or edema  NEURO: alert and oriented to person/place/time, normal speech  VASC: Peripheral pulses are normal in volumes and symmetric bilaterally.   SKIN: no ecchymoses, no rashes    Laboratory/Imaging:  ECHO: 12/20 - Left ventricular function, chamber size, wall motion, and wall thickness are  normal.The EF is 60-65%.  Right ventricular function, chamber size, wall motion, and thickness are  normal.  No regional wall motion abnormalities are seen.  No pericardial effusion is present.     CARDIAC CATH: 12/20 - Normal LMCA. 50% proximal/mid LAD stenosis, physiologically insignificant [dPR 0.93]. 25% proximal / mid LCx, 30% OM1. 70% ulcerated appearing proximal RCA stenosis, 25% distal RCA, 25% Rt PDA, 25% Rt PAV stenosis. Successful stenting or proximal RCA with Synergy 4.0x24, post dilated 4.5 mm.      ASSESSMENT/ PLAN:  # Stable angina vs atypical chest pain vs heartburn  # CAD s/p RCA PCI 12/20   # Moderate LAD disease (dPR 0.93)   History is challenging given patient's dementia. Patient tells me his is comfortable and doesn't notice any lifestyle limiting discomfort. No evidence of anginal symptoms/ischemia. We discussed benefit of starting cardiac rehab as he will be monitored while exerting himself.    - Start taking previously prescribed Imdur  - Start cardiac rehab  -continue ASA 81 mg   -continue Plavix 75 mg "   -continue metoprolol tartrate 12.5 mg BID   -continue atorvastatin 40 mg   - Follow up 6 weeks with self or colleague on my team.     SATHISH Cano, CNP  [facility]  [phone]        Please do not hesitate to contact me if you have any questions/concerns.     Sincerely,     SATHISH Steen CNP

## 2021-01-28 NOTE — PROGRESS NOTES
Cardiology Clinic Note    HPI: Pily Gannon is a 60 year old male with past medical history of HIV, TBI, dementia, and recent RCA PCI who presents via telephone visit for new subtle chest pain. Patient presented to ED w/ syncopal episode and chest pain on 12/15/20. Coronary angiogram revealed significant pRCA lesion and moderate pLAD lesion (FFR 0.93). He underwent pRCA PCI w/ JOSE x 1. Post-procedure TTE showed EF 60-65%. He was started on ASA 81 mg, Plavix 75 mg, metoprolol tartrate 12.5 mg BID, and atorvastatin 40 mg. Patient was seen a week ago by Gabby Lin NP for possible chest pain and Imdur was started.     Of note, patient has dementia and though  Family member presents with him to his visit today, history taking is somewhat difficult. Further, patient presents 25 minutes late to this appointment.    Since last visit, Jagdeep tells me nothing has changed. He believes the symptoms he was having before are due to his eating habits. He is not sure when he gets the symptoms, nor what relieves or exacerbates them. Symptoms are feeling light headed when standing and heartburn feeling after eating. He tells me this is not new, nor has anything changed since his angiogram. This was present both before and after. He does not exercise and has not started cardiac rehab.  No breathing concerns, no palpitations, no syncope. He was prescribed Imdur at his last visit, though never started taking it. He does not check his BP at home, though it Is well controlled in clinic. No bleeding, believes he takes his meds as prescribed with the assistance of his family who sets it up. He has never taken a SL Nitroglycerine. He does not eat healthy. He has gained weight, sister believes due to resuming selective serotonin reuptake inhibitor in the last 2 months. No LE swelling, orthopnea. No other concerns voiced.     Current Outpatient Medications   Medication Sig Dispense Refill     aspirin (ASA) 81 MG EC tablet Take 1  "tablet (81 mg) by mouth daily 90 tablet 0     atorvastatin (LIPITOR) 40 MG tablet Take 1 tablet (40 mg) by mouth every evening 90 tablet 0     bictegravir-emtricitabine-tenofovir (BIKTARVY) -25 MG per tablet Take 1 tablet by mouth       busPIRone (BUSPAR) 5 MG tablet Take 5 mg by mouth       clopidogrel (PLAVIX) 75 MG tablet Take 1 tablet (75 mg) by mouth daily 90 tablet 0     gabapentin (NEURONTIN) 300 MG capsule Take 300 mg by mouth       isosorbide mononitrate (IMDUR) 30 MG 24 hr tablet Take 1 tablet (30 mg) by mouth daily 30 tablet 1     metoprolol tartrate (LOPRESSOR) 25 MG tablet Take 0.5 tablets (12.5 mg) by mouth 2 times daily 90 tablet 0     nitroGLYcerin (NITROSTAT) 0.4 MG sublingual tablet For chest pain place 1 tablet under the tongue every 5 minutes for 3 doses. If symptoms persist 5 minutes after 1st dose call 911. 25 tablet 1     sertraline (ZOLOFT) 50 MG tablet Take 50 mg by mouth         No past medical history on file.    Past Surgical History:   Procedure Laterality Date     CV CORONARY ANGIOGRAM N/A 12/16/2020    Procedure: Coronary Angiogram with possible PCI;  Surgeon: Reji Blake MD;  Location: [facility] CARDIAC CATH LAB     CV FRACTIONAL FLOW RESERVE N/A 12/16/2020    Procedure: Fractional Flow Reserve;  Surgeon: Reji Blake MD;  Location: [facility] CARDIAC CATH LAB     CV PCI STENT DRUG ELUTING N/A 12/16/2020    Procedure: Percutaneous Coronary Intervention Stent Drug Eluting;  Surgeon: Reji Blake MD;  Location: [facility] CARDIAC CATH LAB       No family history on file.    Social History     Tobacco Use     Smoking status: Current Every Day Smoker     Types: Vaping Device     Smokeless tobacco: Never Used   Substance Use Topics     Alcohol use: Not on file       No Known Allergies      ROS:   Negative besides that noted in HPI      Physical Examination:  Vitals: Ht 1.803 m (5' 11\")   Wt 112.9 kg (249 lb)   BMI 34.73 kg/m    BMI= Body mass index is 34.73 " kg/m .    GENERAL APPEARANCE: healthy, alert and no distress  HEENT: no icterus  NECK: JVP is not visible  CHEST: lungs clear to auscultation - no rales, rhonchi or wheezes  CARDIOVASCULAR: regular rhythm, normal S1, S2,   ABDOMEN: soft, non tender  EXTREMITIES: no clubbing, cyanosis or edema  NEURO: alert and oriented to person/place/time, normal speech  VASC: Peripheral pulses are normal in volumes and symmetric bilaterally.   SKIN: no ecchymoses, no rashes    Laboratory/Imaging:  ECHO: 12/20 - Left ventricular function, chamber size, wall motion, and wall thickness are  normal.The EF is 60-65%.  Right ventricular function, chamber size, wall motion, and thickness are  normal.  No regional wall motion abnormalities are seen.  No pericardial effusion is present.     CARDIAC CATH: 12/20 - Normal LMCA. 50% proximal/mid LAD stenosis, physiologically insignificant [dPR 0.93]. 25% proximal / mid LCx, 30% OM1. 70% ulcerated appearing proximal RCA stenosis, 25% distal RCA, 25% Rt PDA, 25% Rt PAV stenosis. Successful stenting or proximal RCA with Synergy 4.0x24, post dilated 4.5 mm.      ASSESSMENT/ PLAN:  # Stable angina vs atypical chest pain vs heartburn  # CAD s/p RCA PCI 12/20   # Moderate LAD disease (dPR 0.93)   History is challenging given patient's dementia. Patient tells me his is comfortable and doesn't notice any lifestyle limiting discomfort. No evidence of anginal symptoms/ischemia. We discussed benefit of starting cardiac rehab as he will be monitored while exerting himself.    - Start taking previously prescribed Imdur  - Start cardiac rehab  -continue ASA 81 mg   -continue Plavix 75 mg   -continue metoprolol tartrate 12.5 mg BID   -continue atorvastatin 40 mg   - Follow up 6 weeks with self or colleague on my team.     Griselda Pedraza, APRN, CNP  [facility]  [phone]

## 2021-03-15 ENCOUNTER — TELEPHONE (OUTPATIENT)
Dept: CARDIOLOGY | Facility: CLINIC | Age: 61
End: 2021-03-15

## 2021-03-15 DIAGNOSIS — I25.10 CORONARY ARTERY DISEASE INVOLVING NATIVE CORONARY ARTERY OF NATIVE HEART WITHOUT ANGINA PECTORIS: ICD-10-CM

## 2021-03-15 NOTE — TELEPHONE ENCOUNTER
[facility] Call Center    Phone Message    May a detailed message be left on voicemail: yes     Reason for Call: Medication Refill Request    Has the patient contacted the pharmacy for the refill? Yes   Name of medication being requested: isosorbide mononitrate (IMDUR) 30 MG 24 hr tablet  Provider who prescribed the medication: Yisel Pedraza APRN CNP  Pharmacy: [address]  Date medication is needed: ASAP         Action Taken: Message routed to:  [facility] ([facility] CARDIO     Travel Screening: Not Applicable

## 2021-03-16 RX ORDER — ISOSORBIDE MONONITRATE 30 MG/1
30 TABLET, EXTENDED RELEASE ORAL DAILY
Qty: 30 TABLET | Refills: 1 | Status: SHIPPED | OUTPATIENT
Start: 2021-03-16

## 2021-03-16 NOTE — TELEPHONE ENCOUNTER
Poke to both [place] and [facility] pharmacies and patient is to be getting his medications from [facility]. Last order from 1- which was sent to [place] will be sent to [facility]. For 30 with 1 refill only as patient is overdue for 6 week follow up appointment.    Scheduling has been notified to contact the pt for appointment.        Kathleen M Doege RN Kathleen M Doege RN

## 2024-02-05 NOTE — UTILIZATION REVIEW
[facility] Utilization Review  Admission Status; Secondary Review Determination     Admission Date: 12/15/2020  1:11 PM      Under the authority of the Utilization Management Committee, the utilization review process indicated a secondary review on the above patient.  The review outcome is based on review of the medical records, discussions with staff, and applying clinical experience noted on the date of the review.        (X)      Inpatient Status Appropriate - This patient's medical care is consistent with medical management for inpatient care and reasonable inpatient medical practice.          RATIONALE FOR DETERMINATION   60-year-old male with history of HIV and TBI, admitted with episode of chest pain and syncope, thought to be unstable angina.  Patient does have cardiac risk factors with tobacco, possible diabetes, hypertension, hyperlipidemia.  CTA coronary shows severe mid RCA stenosis and moderate to severe mid LAD stenosis, plan for coronary angiogram and invasive ischemic evaluation with intervention, started on heparin drip, status post cardiac catheterization with significant focal stenosis of RCA status post PCI with drug-eluting stent, also had focal stenosis of proximal LAD, patient started on aspirin and Plavix, metoprolol, atorvastatin.  Complex patient with unstable angina, found to have mid RCA stenosis and moderate to severe LAD stenosis status post PCI, needs close monitoring in the hospital, with severity of illness and complexity of care, continue inpatient status      The severity of illness, intensity of service provided, expected LOS and risk for adverse outcome make the care complex, high risk and appropriate for hospital admission.The patient requires hospital based medical care which is anticipated to require a stay of 2 or more midnights; according to CMS guidelines the patient should be admitted as inpatient        The information on this document is developed by the  utilization review team in order for the business office to ensure compliance.  This only denotes the appropriateness of proper admission status and does not reflect the quality of care rendered.         The definitions of Inpatient Status and Observation Status used in making the determination above are those provided in the CMS Coverage Manual, Chapter 1 and Chapter 6, section 70.4.      Sincerely,       Tim Walton MD  Physician Advisor  Utilization Review-[place]    Phone: [phone]          100

## (undated) DEVICE — CATH BALLOON EMERGE 2.75X12MMH7493918912270

## (undated) DEVICE — PACK HEART LEFT CUSTOM

## (undated) DEVICE — MANIFOLD KIT ANGIO AUTOMATED 014613

## (undated) DEVICE — CATH BALLOON NC EMERGE 4.00X15MM H7493926715400

## (undated) DEVICE — CATH GUIDING IKARI 6FR 100CM RIGHT HEARTRAIL 40-6380

## (undated) DEVICE — Device

## (undated) DEVICE — TUBING PRESSURE 30"

## (undated) DEVICE — GUIDEWIRE OPTOWIRE 3 W/O GAUGE FACTOR CONNECTOR F1032

## (undated) DEVICE — WIRE GUIDE HI-TRQ BALANCE MDWT JTIP 0.014"X190CM 1001780J-HC

## (undated) DEVICE — 0.035IN X 260CM, EMERALD DIAGNOSTIC GUIDEWIRE, FIXED-CORE PTFE COATED, STANDARD, 3MM EXCHANGE J-TIP (EA/1)

## (undated) DEVICE — KIT ACCESSORY INTRO INFLATION SYS 20/30 PRIORITY 1000186-115

## (undated) DEVICE — VALVE HEMOSTASIS .096" COPILOT MECH 1003331

## (undated) DEVICE — SLEEVE TR BAND RADIAL COMPRESSION DEVICE 24CM TRB24-REG

## (undated) DEVICE — CATH BALLOON NC EMERGE 4.50X15MM H7493926715450

## (undated) DEVICE — SHTH INTRO 0.021IN ID 6FR DIA

## (undated) DEVICE — KIT HAND CONTROL ACIST 014644 AR-P54

## (undated) DEVICE — CATH GUIDING IKARI 6FR IL3.5 LEFT HEARTRAIL 40-6370

## (undated) DEVICE — FASTENER CATH BALLOON CLAMPX2 STATLOCK 0684-00-493

## (undated) RX ORDER — FENTANYL CITRATE 50 UG/ML
INJECTION, SOLUTION INTRAMUSCULAR; INTRAVENOUS
Status: DISPENSED
Start: 2020-12-16

## (undated) RX ORDER — ASPIRIN 81 MG/1
TABLET, CHEWABLE ORAL
Status: DISPENSED
Start: 2020-12-16

## (undated) RX ORDER — VERAPAMIL HYDROCHLORIDE 2.5 MG/ML
INJECTION, SOLUTION INTRAVENOUS
Status: DISPENSED
Start: 2020-12-16

## (undated) RX ORDER — METOPROLOL TARTRATE 1 MG/ML
INJECTION, SOLUTION INTRAVENOUS
Status: DISPENSED
Start: 2020-12-15

## (undated) RX ORDER — DIPHENHYDRAMINE HYDROCHLORIDE 50 MG/ML
INJECTION INTRAMUSCULAR; INTRAVENOUS
Status: DISPENSED
Start: 2020-12-16

## (undated) RX ORDER — HEPARIN SODIUM 1000 [USP'U]/ML
INJECTION, SOLUTION INTRAVENOUS; SUBCUTANEOUS
Status: DISPENSED
Start: 2020-12-16

## (undated) RX ORDER — NITROGLYCERIN 0.4 MG/1
TABLET SUBLINGUAL
Status: DISPENSED
Start: 2020-12-15

## (undated) RX ORDER — NITROGLYCERIN 5 MG/ML
VIAL (ML) INTRAVENOUS
Status: DISPENSED
Start: 2020-12-16